# Patient Record
Sex: MALE | Race: WHITE | NOT HISPANIC OR LATINO | Employment: OTHER | URBAN - METROPOLITAN AREA
[De-identification: names, ages, dates, MRNs, and addresses within clinical notes are randomized per-mention and may not be internally consistent; named-entity substitution may affect disease eponyms.]

---

## 2019-08-02 ENCOUNTER — TELEPHONE (OUTPATIENT)
Dept: SCHEDULING | Facility: IMAGING CENTER | Age: 64
End: 2019-08-02

## 2019-12-17 ENCOUNTER — OFFICE VISIT (OUTPATIENT)
Dept: MEDICAL GROUP | Facility: MEDICAL CENTER | Age: 64
End: 2019-12-17
Payer: MEDICARE

## 2019-12-17 VITALS
DIASTOLIC BLOOD PRESSURE: 62 MMHG | HEIGHT: 75 IN | TEMPERATURE: 97.9 F | RESPIRATION RATE: 16 BRPM | WEIGHT: 208 LBS | BODY MASS INDEX: 25.86 KG/M2 | OXYGEN SATURATION: 95 % | HEART RATE: 80 BPM | SYSTOLIC BLOOD PRESSURE: 126 MMHG

## 2019-12-17 DIAGNOSIS — Z95.0 PRESENCE OF CARDIAC PACEMAKER: ICD-10-CM

## 2019-12-17 DIAGNOSIS — Z96.611 STATUS POST RIGHT SHOULDER HEMIARTHROPLASTY: ICD-10-CM

## 2019-12-17 DIAGNOSIS — I49.5 SICK SINUS SYNDROME (HCC): ICD-10-CM

## 2019-12-17 DIAGNOSIS — Z12.5 ENCOUNTER FOR SCREENING FOR MALIGNANT NEOPLASM OF PROSTATE: ICD-10-CM

## 2019-12-17 DIAGNOSIS — M10.00 IDIOPATHIC GOUT, UNSPECIFIED CHRONICITY, UNSPECIFIED SITE: ICD-10-CM

## 2019-12-17 DIAGNOSIS — Z23 NEED FOR VACCINATION: ICD-10-CM

## 2019-12-17 DIAGNOSIS — Z11.59 ENCOUNTER FOR HEPATITIS C SCREENING TEST FOR LOW RISK PATIENT: ICD-10-CM

## 2019-12-17 DIAGNOSIS — R73.01 IMPAIRED FASTING GLUCOSE: ICD-10-CM

## 2019-12-17 DIAGNOSIS — E78.49 OTHER HYPERLIPIDEMIA: ICD-10-CM

## 2019-12-17 DIAGNOSIS — G89.29 CHRONIC RIGHT SHOULDER PAIN: ICD-10-CM

## 2019-12-17 DIAGNOSIS — M25.511 CHRONIC RIGHT SHOULDER PAIN: ICD-10-CM

## 2019-12-17 DIAGNOSIS — I10 ESSENTIAL HYPERTENSION: ICD-10-CM

## 2019-12-17 DIAGNOSIS — Z98.1 HISTORY OF LUMBAR FUSION: ICD-10-CM

## 2019-12-17 PROBLEM — R00.1 BRADYCARDIA: Status: ACTIVE | Noted: 2018-07-05

## 2019-12-17 PROBLEM — S32.009A FRACTURE OF LUMBAR VERTEBRA (HCC): Status: ACTIVE | Noted: 2018-02-26

## 2019-12-17 PROBLEM — Z87.898 HISTORY OF SEIZURE: Status: ACTIVE | Noted: 2018-02-16

## 2019-12-17 PROBLEM — H26.9 CATARACT OF BOTH EYES: Status: ACTIVE | Noted: 2019-04-12

## 2019-12-17 PROBLEM — R00.1 BRADYCARDIA: Status: RESOLVED | Noted: 2018-07-05 | Resolved: 2019-12-17

## 2019-12-17 PROBLEM — S42.301A RIGHT HUMERAL FRACTURE: Status: ACTIVE | Noted: 2018-01-31

## 2019-12-17 PROBLEM — I47.29 VENTRICULAR TACHYCARDIA, NONSUSTAINED (HCC): Status: ACTIVE | Noted: 2018-06-11

## 2019-12-17 PROCEDURE — 99204 OFFICE O/P NEW MOD 45 MIN: CPT | Mod: 25 | Performed by: INTERNAL MEDICINE

## 2019-12-17 PROCEDURE — G0009 ADMIN PNEUMOCOCCAL VACCINE: HCPCS | Performed by: INTERNAL MEDICINE

## 2019-12-17 PROCEDURE — 90670 PCV13 VACCINE IM: CPT | Performed by: INTERNAL MEDICINE

## 2019-12-17 RX ORDER — LISINOPRIL 10 MG/1
TABLET ORAL
COMMUNITY
Start: 2018-02-21 | End: 2019-12-17 | Stop reason: SDUPTHER

## 2019-12-17 RX ORDER — LISINOPRIL 10 MG/1
10 TABLET ORAL DAILY
Qty: 90 TAB | Refills: 3 | Status: SHIPPED | OUTPATIENT
Start: 2019-12-17 | End: 2020-11-23

## 2019-12-17 RX ORDER — ALLOPURINOL 300 MG/1
TABLET ORAL
COMMUNITY
Start: 2019-01-31 | End: 2019-12-17 | Stop reason: SDUPTHER

## 2019-12-17 RX ORDER — BISOPROLOL FUMARATE 5 MG/1
TABLET, FILM COATED ORAL
Qty: 90 TAB | Refills: 3 | Status: SHIPPED | OUTPATIENT
Start: 2019-12-17 | End: 2020-11-23

## 2019-12-17 RX ORDER — ALLOPURINOL 300 MG/1
300 TABLET ORAL DAILY
Qty: 90 TAB | Refills: 3 | Status: SHIPPED | OUTPATIENT
Start: 2019-12-17 | End: 2020-11-23

## 2019-12-17 RX ORDER — ATORVASTATIN CALCIUM 10 MG/1
TABLET, FILM COATED ORAL
COMMUNITY
Start: 2018-02-21 | End: 2019-12-17 | Stop reason: SDUPTHER

## 2019-12-17 RX ORDER — ATORVASTATIN CALCIUM 10 MG/1
10 TABLET, FILM COATED ORAL DAILY
Qty: 90 TAB | Refills: 3 | Status: SHIPPED | OUTPATIENT
Start: 2019-12-17 | End: 2020-11-23

## 2019-12-17 RX ORDER — BISOPROLOL FUMARATE 5 MG/1
TABLET, FILM COATED ORAL
COMMUNITY
Start: 2019-04-25 | End: 2019-12-17 | Stop reason: SDUPTHER

## 2019-12-17 ASSESSMENT — PATIENT HEALTH QUESTIONNAIRE - PHQ9: CLINICAL INTERPRETATION OF PHQ2 SCORE: 0
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
## 2019-12-20 ENCOUNTER — HOSPITAL ENCOUNTER (OUTPATIENT)
Dept: LAB | Facility: MEDICAL CENTER | Age: 64
End: 2019-12-20
Attending: INTERNAL MEDICINE
Payer: MEDICARE

## 2019-12-20 DIAGNOSIS — E78.49 OTHER HYPERLIPIDEMIA: ICD-10-CM

## 2019-12-20 DIAGNOSIS — M10.00 IDIOPATHIC GOUT, UNSPECIFIED CHRONICITY, UNSPECIFIED SITE: ICD-10-CM

## 2019-12-20 DIAGNOSIS — I10 ESSENTIAL HYPERTENSION: ICD-10-CM

## 2019-12-20 DIAGNOSIS — R73.01 IMPAIRED FASTING GLUCOSE: ICD-10-CM

## 2019-12-20 DIAGNOSIS — Z11.59 ENCOUNTER FOR HEPATITIS C SCREENING TEST FOR LOW RISK PATIENT: ICD-10-CM

## 2019-12-20 DIAGNOSIS — Z12.5 ENCOUNTER FOR SCREENING FOR MALIGNANT NEOPLASM OF PROSTATE: ICD-10-CM

## 2019-12-20 LAB
ALBUMIN SERPL BCP-MCNC: 4.5 G/DL (ref 3.2–4.9)
ALBUMIN/GLOB SERPL: 1.6 G/DL
ALP SERPL-CCNC: 74 U/L (ref 30–99)
ALT SERPL-CCNC: 25 U/L (ref 2–50)
ANION GAP SERPL CALC-SCNC: 9 MMOL/L (ref 0–11.9)
AST SERPL-CCNC: 25 U/L (ref 12–45)
BASOPHILS # BLD AUTO: 0.6 % (ref 0–1.8)
BASOPHILS # BLD: 0.04 K/UL (ref 0–0.12)
BILIRUB SERPL-MCNC: 0.5 MG/DL (ref 0.1–1.5)
BUN SERPL-MCNC: 16 MG/DL (ref 8–22)
CALCIUM SERPL-MCNC: 9.4 MG/DL (ref 8.5–10.5)
CHLORIDE SERPL-SCNC: 104 MMOL/L (ref 96–112)
CHOLEST SERPL-MCNC: 170 MG/DL (ref 100–199)
CO2 SERPL-SCNC: 30 MMOL/L (ref 20–33)
CREAT SERPL-MCNC: 0.87 MG/DL (ref 0.5–1.4)
EOSINOPHIL # BLD AUTO: 0.15 K/UL (ref 0–0.51)
EOSINOPHIL NFR BLD: 2.1 % (ref 0–6.9)
ERYTHROCYTE [DISTWIDTH] IN BLOOD BY AUTOMATED COUNT: 44.1 FL (ref 35.9–50)
FASTING STATUS PATIENT QL REPORTED: NORMAL
GLOBULIN SER CALC-MCNC: 2.8 G/DL (ref 1.9–3.5)
GLUCOSE SERPL-MCNC: 93 MG/DL (ref 65–99)
HCT VFR BLD AUTO: 48.9 % (ref 42–52)
HCV AB SER QL: NEGATIVE
HDLC SERPL-MCNC: 50 MG/DL
HGB BLD-MCNC: 15.7 G/DL (ref 14–18)
IMM GRANULOCYTES # BLD AUTO: 0.02 K/UL (ref 0–0.11)
IMM GRANULOCYTES NFR BLD AUTO: 0.3 % (ref 0–0.9)
LDLC SERPL CALC-MCNC: 95 MG/DL
LYMPHOCYTES # BLD AUTO: 1.61 K/UL (ref 1–4.8)
LYMPHOCYTES NFR BLD: 22.3 % (ref 22–41)
MCH RBC QN AUTO: 30.4 PG (ref 27–33)
MCHC RBC AUTO-ENTMCNC: 32.1 G/DL (ref 33.7–35.3)
MCV RBC AUTO: 94.6 FL (ref 81.4–97.8)
MONOCYTES # BLD AUTO: 0.96 K/UL (ref 0–0.85)
MONOCYTES NFR BLD AUTO: 13.3 % (ref 0–13.4)
NEUTROPHILS # BLD AUTO: 4.43 K/UL (ref 1.82–7.42)
NEUTROPHILS NFR BLD: 61.4 % (ref 44–72)
NRBC # BLD AUTO: 0 K/UL
NRBC BLD-RTO: 0 /100 WBC
PLATELET # BLD AUTO: 223 K/UL (ref 164–446)
PMV BLD AUTO: 9.8 FL (ref 9–12.9)
POTASSIUM SERPL-SCNC: 4.5 MMOL/L (ref 3.6–5.5)
PROT SERPL-MCNC: 7.3 G/DL (ref 6–8.2)
PSA SERPL-MCNC: 1.05 NG/ML (ref 0–4)
RBC # BLD AUTO: 5.17 M/UL (ref 4.7–6.1)
SODIUM SERPL-SCNC: 143 MMOL/L (ref 135–145)
TRIGL SERPL-MCNC: 124 MG/DL (ref 0–149)
URATE SERPL-MCNC: 5.4 MG/DL (ref 2.5–8.3)
WBC # BLD AUTO: 7.2 K/UL (ref 4.8–10.8)

## 2019-12-20 PROCEDURE — 86803 HEPATITIS C AB TEST: CPT

## 2019-12-20 PROCEDURE — 84153 ASSAY OF PSA TOTAL: CPT | Mod: GA

## 2019-12-20 PROCEDURE — 84550 ASSAY OF BLOOD/URIC ACID: CPT

## 2019-12-20 PROCEDURE — 80053 COMPREHEN METABOLIC PANEL: CPT

## 2019-12-20 PROCEDURE — 83036 HEMOGLOBIN GLYCOSYLATED A1C: CPT | Mod: GA

## 2019-12-20 PROCEDURE — 80061 LIPID PANEL: CPT

## 2019-12-20 PROCEDURE — 36415 COLL VENOUS BLD VENIPUNCTURE: CPT

## 2019-12-20 PROCEDURE — 85025 COMPLETE CBC W/AUTO DIFF WBC: CPT

## 2019-12-21 LAB
EST. AVERAGE GLUCOSE BLD GHB EST-MCNC: 114 MG/DL
HBA1C MFR BLD: 5.6 % (ref 0–5.6)

## 2020-02-04 ENCOUNTER — TELEPHONE (OUTPATIENT)
Dept: CARDIOLOGY | Facility: MEDICAL CENTER | Age: 65
End: 2020-02-04

## 2020-02-19 ENCOUNTER — OFFICE VISIT (OUTPATIENT)
Dept: CARDIOLOGY | Facility: MEDICAL CENTER | Age: 65
End: 2020-02-19
Payer: MEDICARE

## 2020-02-19 VITALS
SYSTOLIC BLOOD PRESSURE: 130 MMHG | HEIGHT: 75 IN | OXYGEN SATURATION: 96 % | WEIGHT: 205 LBS | DIASTOLIC BLOOD PRESSURE: 94 MMHG | BODY MASS INDEX: 25.49 KG/M2 | RESPIRATION RATE: 16 BRPM | HEART RATE: 70 BPM

## 2020-02-19 DIAGNOSIS — I47.29 VENTRICULAR TACHYCARDIA, NONSUSTAINED (HCC): ICD-10-CM

## 2020-02-19 DIAGNOSIS — E78.49 OTHER HYPERLIPIDEMIA: ICD-10-CM

## 2020-02-19 DIAGNOSIS — Z95.0 PRESENCE OF CARDIAC PACEMAKER: ICD-10-CM

## 2020-02-19 DIAGNOSIS — I49.5 SICK SINUS SYNDROME (HCC): ICD-10-CM

## 2020-02-19 DIAGNOSIS — I10 ESSENTIAL HYPERTENSION: ICD-10-CM

## 2020-02-19 LAB — EKG IMPRESSION: NORMAL

## 2020-02-19 PROCEDURE — 93000 ELECTROCARDIOGRAM COMPLETE: CPT | Performed by: INTERNAL MEDICINE

## 2020-02-19 PROCEDURE — 99214 OFFICE O/P EST MOD 30 MIN: CPT | Performed by: INTERNAL MEDICINE

## 2020-02-19 RX ORDER — SENNOSIDES A AND B 8.6 MG/1
8.6-17.2 TABLET, FILM COATED ORAL
COMMUNITY
Start: 2018-02-14 | End: 2020-09-28

## 2020-02-19 ASSESSMENT — ENCOUNTER SYMPTOMS
STRIDOR: 0
MUSCULOSKELETAL NEGATIVE: 1
CLAUDICATION: 0
HEMOPTYSIS: 0
SPUTUM PRODUCTION: 0
FEVER: 0
GASTROINTESTINAL NEGATIVE: 1
PALPITATIONS: 0
RESPIRATORY NEGATIVE: 1
EYES NEGATIVE: 1
BRUISES/BLEEDS EASILY: 0
WHEEZING: 0
CONSTITUTIONAL NEGATIVE: 1
CHILLS: 0
LOSS OF CONSCIOUSNESS: 0
DIZZINESS: 0
COUGH: 0
ORTHOPNEA: 0
SHORTNESS OF BREATH: 0
CARDIOVASCULAR NEGATIVE: 1
NEUROLOGICAL NEGATIVE: 1
SORE THROAT: 0
WEAKNESS: 0
PND: 0

## 2020-02-19 NOTE — PROGRESS NOTES
Chief Complaint   Patient presents with   • Hypertension       Subjective:   Huan Fish is a 64 y.o. male who presents today as a new patient.  He is a 64-year-old male who has a past medical history of a seizure-like activity while he was in [place].  He was worked up by neurology but no etiology can be found.  He did have some pauses on a monitor per the outside report.  He also had some inducible VT or possibly nonsustained VT on his monitor.  He was put on his bisoprolol.  He had a biventricular pacemaker installed.  Is a Biotronik pacemaker.  He is also on lisinopril aspirin and atorvastatin.  Since that single episode in [place] he has had no further recurrence.  He did have a nuclear stress test echo and CT scan which were all normal.  He has no chest pain palpitations or PND.    Past Medical History:   Diagnosis Date   • Cataract    • Hyperlipidemia    • Hypertension    • Seizure (HCC)     questionable sz hx 2018     Past Surgical History:   Procedure Laterality Date   • OTHER  2018    Status post T12-L2 posterior spinal fusion bridging   (L1 fracture)   • OTHER  2018    pacemaker   • OTHER Right 2018    hemiarthroplasty/SHOULDER RECONSTRUCTION PARTIAL shoulder   • TONSILLECTOMY       Family History   Problem Relation Age of Onset   • Stroke Mother    • Heart Disease Father    • Hypertension Father    • Heart Disease Brother      Social History     Socioeconomic History   • Marital status:      Spouse name: Not on file   • Number of children: Not on file   • Years of education: Not on file   • Highest education level: Not on file   Occupational History   • Not on file   Social Needs   • Financial resource strain: Not on file   • Food insecurity     Worry: Not on file     Inability: Not on file   • Transportation needs     Medical: Not on file     Non-medical: Not on file   Tobacco Use   • Smoking status: Never Smoker   • Smokeless tobacco: Never Used   Substance and Sexual Activity   •  Alcohol use: Yes   • Drug use: Never   • Sexual activity: Not on file   Lifestyle   • Physical activity     Days per week: Not on file     Minutes per session: Not on file   • Stress: Not on file   Relationships   • Social connections     Talks on phone: Not on file     Gets together: Not on file     Attends Nondenominational service: Not on file     Active member of club or organization: Not on file     Attends meetings of clubs or organizations: Not on file     Relationship status: Not on file   • Intimate partner violence     Fear of current or ex partner: Not on file     Emotionally abused: Not on file     Physically abused: Not on file     Forced sexual activity: Not on file   Other Topics Concern   • Not on file   Social History Narrative   • Not on file     No Known Allergies  Outpatient Encounter Medications as of 2/19/2020   Medication Sig Dispense Refill   • aspirin EC (ECOTRIN) 81 MG Tablet Delayed Response Take  by mouth.     • lisinopril (PRINIVIL) 10 MG Tab Take 1 Tab by mouth every day. 90 Tab 3   • bisoprolol (ZEBETA) 5 MG Tab Take 1 tablet by mouth daily 90 Tab 3   • atorvastatin (LIPITOR) 10 MG Tab Take 1 Tab by mouth every day. 90 Tab 3   • allopurinol (ZYLOPRIM) 300 MG Tab Take 1 Tab by mouth every day. 90 Tab 3   • sennosides (SENOKOT) 8.6 MG Tab Take 8.6-17.2 mg by mouth.       No facility-administered encounter medications on file as of 2/19/2020.      Review of Systems   Constitutional: Negative.  Negative for chills, fever and malaise/fatigue.   HENT: Negative.  Negative for sore throat.    Eyes: Negative.    Respiratory: Negative.  Negative for cough, hemoptysis, sputum production, shortness of breath, wheezing and stridor.    Cardiovascular: Negative.  Negative for chest pain, palpitations, orthopnea, claudication, leg swelling and PND.   Gastrointestinal: Negative.    Genitourinary: Negative.    Musculoskeletal: Negative.    Skin: Negative.    Neurological: Negative.  Negative for dizziness, loss  "of consciousness and weakness.   Endo/Heme/Allergies: Negative.  Does not bruise/bleed easily.   All other systems reviewed and are negative.       Objective:   /94 (BP Location: Left arm, Patient Position: Sitting, BP Cuff Size: Adult)   Pulse 70   Resp 16   Ht 1.905 m (6' 3\")   Wt 93 kg (205 lb)   SpO2 96%   BMI 25.62 kg/m²     Physical Exam   Constitutional: He appears well-developed and well-nourished. No distress.   HENT:   Head: Normocephalic and atraumatic.   Right Ear: External ear normal.   Left Ear: External ear normal.   Nose: Nose normal.   Mouth/Throat: No oropharyngeal exudate.   Eyes: Pupils are equal, round, and reactive to light. Conjunctivae and EOM are normal. Right eye exhibits no discharge. Left eye exhibits no discharge. No scleral icterus.   Neck: Neck supple. No JVD present.   Cardiovascular: Normal rate, regular rhythm and intact distal pulses. Exam reveals no gallop and no friction rub.   No murmur heard.  Pulmonary/Chest: Effort normal. No stridor. No respiratory distress. He has no wheezes. He has no rales. He exhibits no tenderness.   Abdominal: Soft. He exhibits no distension. There is no guarding.   Musculoskeletal: Normal range of motion.         General: No tenderness, deformity or edema.   Neurological: He is alert. He has normal reflexes. He displays normal reflexes. No cranial nerve deficit. He exhibits normal muscle tone. Coordination normal.   Skin: Skin is warm and dry. No rash noted. He is not diaphoretic. No erythema. No pallor.   Psychiatric: He has a normal mood and affect. His behavior is normal. Judgment and thought content normal.   Nursing note and vitals reviewed.      Assessment:     1. Essential hypertension  EKG   2. Other hyperlipidemia     3. Presence of cardiac pacemaker     4. Sick sinus syndrome (HCC)     5. Ventricular tachycardia, nonsustained (HCC)         Medical Decision Making:  Today's Assessment / Status / Plan:     64-year-old male with a " symptom which sounds similar to seizures versus pseudoseizures with an unclear etiology but possibly cardiac.  At this point with no recurrence I am hesitant to do any further work-up.  If he has recurrence and findings on his pacemaker to support this we will then likely pursue a cardiac MRI.  In the meantime we will have him establish with our pacemaker clinic.  We will keep him on the bisoprolol.  He does report being 54% V paced we will monitor for heart failure.    1. Seizure like activity, possible VT    - cont bisoprolol    - cont PPM, interrogate as needed    2. PPM    - establish with device clinic    3. HTN    - cont meds    4. High V-pacing    - monitor for CHF

## 2020-03-02 ENCOUNTER — NON-PROVIDER VISIT (OUTPATIENT)
Dept: CARDIOLOGY | Facility: MEDICAL CENTER | Age: 65
End: 2020-03-02
Payer: MEDICARE

## 2020-03-02 VITALS
HEIGHT: 75 IN | BODY MASS INDEX: 25.49 KG/M2 | HEART RATE: 86 BPM | SYSTOLIC BLOOD PRESSURE: 132 MMHG | WEIGHT: 205 LBS | OXYGEN SATURATION: 94 % | DIASTOLIC BLOOD PRESSURE: 90 MMHG

## 2020-03-02 DIAGNOSIS — Z95.0 PRESENCE OF CARDIAC PACEMAKER: ICD-10-CM

## 2020-03-02 DIAGNOSIS — I49.5 SICK SINUS SYNDROME (HCC): ICD-10-CM

## 2020-03-02 PROCEDURE — 93280 PM DEVICE PROGR EVAL DUAL: CPT | Performed by: NURSE PRACTITIONER

## 2020-03-02 ASSESSMENT — FIBROSIS 4 INDEX: FIB4 SCORE: 1.46

## 2020-03-02 NOTE — PROGRESS NOTES
Patient saw Dr. Jenkins on 2/19/2020 to establish care. PM was implanted in July 2018 in [place], [place], for sick sinus syndrome.    Device is working normally. No mode switching episodes.  Normal sensing and capture of RA and RV leads; stable impedances. Battery longevity is 8 years 3 months.  No changes are made today.    I will try to make sure the his Biotronik home monitoring system is transferred to our clinic.    FU in 6 months for next PM check with me.    Collaborating MD: Jefry

## 2020-03-18 ENCOUNTER — TELEPHONE (OUTPATIENT)
Dept: HEALTH INFORMATION MANAGEMENT | Facility: OTHER | Age: 65
End: 2020-03-18

## 2020-03-18 NOTE — TELEPHONE ENCOUNTER
"1. Caller Name: Huan Fish speaking with wife Amira                        Call Back Number: [phone]  [facility] PCP or Specialty Provider: Yes Lashawn Putnam M.D.        2.  Does patient have any active symptoms of respiratory illness (fever OR cough OR shortness of breath)? Yes, the patient reports the following respiratory symptoms: cough. 99.5 temp, runny nose, congestion, wet loose sounding when coughing and some wheezing, sputum is light yellow, trying to cough, deep breathe and taking OTC meds.  Started Saturday and has progressively gotten worse but is still manageable.    3.  Does patient have any comoribidities? None  has pacemaker, had a \"seizure\"  2 years ago and broke his back and shoulder had spinal surgery and shoulder replacement.  Wife feels he is somewhat weaker after this.Still goes to [facility].    4.  In the last 30 days, has the patient traveled outside of the country OR in a high risk area within the  OR have any known contact with someone who has or is suspected to have COVID-19?  Yes, lives in [place] and traveled to [facility] and has been to PT at [facility] on Fri 13th    5. Disposition: Advised to schedule with their insurance's preferred virtual visit provider to limit potential exposure to others;   [facility]:   [place] [phone]  or  Doctor chad Harrison [phone] because patient lives in [place] [facility] [place] and is far from a larger medical center they should monitor for worsening symptoms and go to a . If situation is emergent proceed to local hospital ED or call 911.      Called wife back after consulting with triage physician, concern for what oxygen might be and it might be a good idea to be checked at a local .  Wife has an oximeter and will try to find that to get a baseline. Before heading to .  Note routed to PCP: Provider action needed:  Patient advised to go to urgent care for oxygen check r/t sob      "

## 2020-05-14 ENCOUNTER — APPOINTMENT (OUTPATIENT)
Dept: MEDICAL GROUP | Facility: MEDICAL CENTER | Age: 65
End: 2020-05-14
Payer: MEDICARE

## 2020-09-28 ENCOUNTER — NON-PROVIDER VISIT (OUTPATIENT)
Dept: CARDIOLOGY | Facility: MEDICAL CENTER | Age: 65
End: 2020-09-28
Payer: MEDICARE

## 2020-09-28 VITALS
RESPIRATION RATE: 18 BRPM | HEART RATE: 85 BPM | BODY MASS INDEX: 25.86 KG/M2 | OXYGEN SATURATION: 95 % | DIASTOLIC BLOOD PRESSURE: 72 MMHG | WEIGHT: 208 LBS | HEIGHT: 75 IN | SYSTOLIC BLOOD PRESSURE: 108 MMHG

## 2020-09-28 DIAGNOSIS — I49.5 SICK SINUS SYNDROME (HCC): ICD-10-CM

## 2020-09-28 DIAGNOSIS — Z95.0 PRESENCE OF CARDIAC PACEMAKER: ICD-10-CM

## 2020-09-28 PROCEDURE — 93280 PM DEVICE PROGR EVAL DUAL: CPT | Performed by: NURSE PRACTITIONER

## 2020-09-28 RX ORDER — KETOROLAC TROMETHAMINE 5 MG/ML
SOLUTION OPHTHALMIC
COMMUNITY
Start: 2020-09-15 | End: 2020-09-28

## 2020-09-28 RX ORDER — PREDNISOLONE ACETATE 10 MG/ML
SUSPENSION/ DROPS OPHTHALMIC
COMMUNITY
Start: 2020-09-15 | End: 2020-09-28

## 2020-09-28 RX ORDER — MOXIFLOXACIN 5 MG/ML
SOLUTION/ DROPS OPHTHALMIC
COMMUNITY
Start: 2020-09-15 | End: 2020-09-28

## 2020-09-28 ASSESSMENT — FIBROSIS 4 INDEX: FIB4 SCORE: 1.48

## 2020-09-28 NOTE — PROGRESS NOTES
Device is working normally. No mode switching episodes.  Normal sensing and capture of RA and RV leads; stable impedances. Battery longevity is 7 years 11 month.  No changes are made today.    FU in 6 months for next PM check with me.    He is due to see Dr. Jenkins for annual follow-up in February 2021.    I did also confirm that his Biotronik home monitor is indeed transmitting to our clinic.

## 2020-12-07 ENCOUNTER — NON-PROVIDER VISIT (OUTPATIENT)
Dept: MEDICAL GROUP | Facility: MEDICAL CENTER | Age: 65
End: 2020-12-07
Payer: MEDICARE

## 2020-12-07 DIAGNOSIS — Z23 NEED FOR VACCINATION: ICD-10-CM

## 2020-12-07 PROCEDURE — 90662 IIV NO PRSV INCREASED AG IM: CPT | Performed by: INTERNAL MEDICINE

## 2020-12-07 PROCEDURE — G0008 ADMIN INFLUENZA VIRUS VAC: HCPCS | Performed by: INTERNAL MEDICINE

## 2020-12-07 NOTE — PROGRESS NOTES
"Huan Fish is a 65 y.o. male here for a non-provider visit for:   FLU    Reason for immunization: Annual Flu Vaccine  Immunization records indicate need for vaccine: Yes, confirmed with Epic  Minimum interval has been met for this vaccine: Yes  ABN completed: Not Indicated    Order and dose verified by: SHARIFA ROSAS Dated  [id number] was given to patient: Yes  All IAC Questionnaire questions were answered \"No.\"    Patient tolerated injection and no adverse effects were observed or reported: Yes    Pt scheduled for next dose in series: Not Indicated  "

## 2021-03-29 ENCOUNTER — NON-PROVIDER VISIT (OUTPATIENT)
Dept: CARDIOLOGY | Facility: MEDICAL CENTER | Age: 66
End: 2021-03-29
Payer: MEDICARE

## 2021-03-29 VITALS
WEIGHT: 218 LBS | OXYGEN SATURATION: 94 % | HEART RATE: 72 BPM | SYSTOLIC BLOOD PRESSURE: 126 MMHG | DIASTOLIC BLOOD PRESSURE: 72 MMHG | BODY MASS INDEX: 27.1 KG/M2 | HEIGHT: 75 IN

## 2021-03-29 DIAGNOSIS — Z95.0 PRESENCE OF CARDIAC PACEMAKER: ICD-10-CM

## 2021-03-29 DIAGNOSIS — I49.5 SICK SINUS SYNDROME (HCC): ICD-10-CM

## 2021-03-29 PROCEDURE — 93280 PM DEVICE PROGR EVAL DUAL: CPT | Performed by: NURSE PRACTITIONER

## 2021-03-29 RX ORDER — ASPIRIN 81 MG/1
81 TABLET ORAL DAILY
COMMUNITY

## 2021-03-29 ASSESSMENT — FIBROSIS 4 INDEX: FIB4 SCORE: 1.48

## 2021-03-29 NOTE — PROGRESS NOTES
Device is working normally. No mode switching episodes.  Normal sensing and capture of RA and RV leads; stable impedances. Battery longevity is 7 years 7 months.  No changes are made today.    FU in 6 months for next PM check with me, as well as FU with Dr. Jenkins.

## 2021-09-20 ENCOUNTER — NON-PROVIDER VISIT (OUTPATIENT)
Dept: CARDIOLOGY | Facility: MEDICAL CENTER | Age: 66
End: 2021-09-20
Payer: MEDICARE

## 2021-09-20 VITALS
SYSTOLIC BLOOD PRESSURE: 128 MMHG | HEART RATE: 67 BPM | WEIGHT: 214 LBS | RESPIRATION RATE: 16 BRPM | BODY MASS INDEX: 26.61 KG/M2 | HEIGHT: 75 IN | OXYGEN SATURATION: 96 % | DIASTOLIC BLOOD PRESSURE: 62 MMHG

## 2021-09-20 DIAGNOSIS — I49.5 SICK SINUS SYNDROME (HCC): ICD-10-CM

## 2021-09-20 DIAGNOSIS — Z95.0 PRESENCE OF CARDIAC PACEMAKER: ICD-10-CM

## 2021-09-20 PROCEDURE — 93280 PM DEVICE PROGR EVAL DUAL: CPT | Performed by: NURSE PRACTITIONER

## 2021-09-20 ASSESSMENT — FIBROSIS 4 INDEX: FIB4 SCORE: 1.5

## 2021-09-20 NOTE — PROGRESS NOTES
Device is working normally. No mode switching episodes.  Normal sensing and capture of RA and RV leads; stable impedances. Battery longevity is 7 years 2 months.  No changes are made today.    He does see Dr. Jenkins in November 2021.    FU in 6 months for next PM check with me.

## 2021-11-03 ENCOUNTER — OFFICE VISIT (OUTPATIENT)
Dept: CARDIOLOGY | Facility: MEDICAL CENTER | Age: 66
End: 2021-11-03
Payer: MEDICARE

## 2021-11-03 VITALS
BODY MASS INDEX: 26.61 KG/M2 | HEART RATE: 86 BPM | HEIGHT: 75 IN | SYSTOLIC BLOOD PRESSURE: 124 MMHG | DIASTOLIC BLOOD PRESSURE: 84 MMHG | WEIGHT: 214 LBS | OXYGEN SATURATION: 95 % | RESPIRATION RATE: 16 BRPM

## 2021-11-03 DIAGNOSIS — I49.5 SICK SINUS SYNDROME (HCC): ICD-10-CM

## 2021-11-03 DIAGNOSIS — E78.49 OTHER HYPERLIPIDEMIA: ICD-10-CM

## 2021-11-03 DIAGNOSIS — Z95.0 PRESENCE OF CARDIAC PACEMAKER: ICD-10-CM

## 2021-11-03 DIAGNOSIS — I47.29 VENTRICULAR TACHYCARDIA, NONSUSTAINED (HCC): ICD-10-CM

## 2021-11-03 DIAGNOSIS — I71.30 ABDOMINAL AORTIC ANEURYSM, RUPTURED (HCC): ICD-10-CM

## 2021-11-03 DIAGNOSIS — I10 ESSENTIAL HYPERTENSION: ICD-10-CM

## 2021-11-03 DIAGNOSIS — Z96.611 STATUS POST RIGHT SHOULDER HEMIARTHROPLASTY: ICD-10-CM

## 2021-11-03 PROCEDURE — 99214 OFFICE O/P EST MOD 30 MIN: CPT | Performed by: INTERNAL MEDICINE

## 2021-11-03 RX ORDER — BISOPROLOL FUMARATE 5 MG/1
TABLET, FILM COATED ORAL
Qty: 90 TABLET | Refills: 3 | Status: SHIPPED | OUTPATIENT
Start: 2021-11-03 | End: 2022-09-20

## 2021-11-03 RX ORDER — ATORVASTATIN CALCIUM 10 MG/1
10 TABLET, FILM COATED ORAL EVERY EVENING
Qty: 90 TABLET | Refills: 3 | Status: SHIPPED | OUTPATIENT
Start: 2021-11-03 | End: 2022-09-20

## 2021-11-03 RX ORDER — LISINOPRIL 10 MG/1
10 TABLET ORAL DAILY
Qty: 90 TABLET | Refills: 3 | Status: SHIPPED | OUTPATIENT
Start: 2021-11-03 | End: 2022-09-20

## 2021-11-03 ASSESSMENT — ENCOUNTER SYMPTOMS
CLAUDICATION: 0
RESPIRATORY NEGATIVE: 1
SHORTNESS OF BREATH: 0
FEVER: 0
WHEEZING: 0
WEAKNESS: 0
NEUROLOGICAL NEGATIVE: 1
MUSCULOSKELETAL NEGATIVE: 1
CONSTITUTIONAL NEGATIVE: 1
BRUISES/BLEEDS EASILY: 0
LOSS OF CONSCIOUSNESS: 0
CARDIOVASCULAR NEGATIVE: 1
ORTHOPNEA: 0
EYES NEGATIVE: 1
CHILLS: 0
COUGH: 0
PND: 0
HEMOPTYSIS: 0
STRIDOR: 0
SPUTUM PRODUCTION: 0
GASTROINTESTINAL NEGATIVE: 1
DIZZINESS: 0
PALPITATIONS: 0
SORE THROAT: 0

## 2021-11-03 ASSESSMENT — FIBROSIS 4 INDEX: FIB4 SCORE: 1.5

## 2021-11-03 NOTE — PROGRESS NOTES
Chief Complaint   Patient presents with   • Hypertension   • Hyperlipidemia       Subjective     Huan Fish is a 66 y.o. male who presents today as a follow-up for his pacemaker placement for sick sinus syndrome as well as hypertension hyperlipidemia.  I did finally receive his records from [place].  It sounds like he had sick sinus syndrome but no VT was noted.  He is otherwise in the last year and doing well.  His post recent pacemaker interrogation was unremarkable.    Past Medical History:   Diagnosis Date   • Cataract    • Hyperlipidemia    • Hypertension    • Seizure (HCC)     questionable sz hx 2018   • Sick sinus syndrome (HCC) 07/2018    Status post pacemaker placement.     Past Surgical History:   Procedure Laterality Date   • PACEMAKER INSERTION Left 07/07/2018    Biotronik Edora 8 JEANNINE implanted at [facility].   • OTHER  2018    Status post T12-L2 posterior spinal fusion bridging   (L1 fracture)   • OTHER Right 2018    hemiarthroplasty/SHOULDER RECONSTRUCTION PARTIAL shoulder   • TONSILLECTOMY       Family History   Problem Relation Age of Onset   • Stroke Mother    • Heart Disease Father    • Hypertension Father    • Heart Disease Brother      Social History     Socioeconomic History   • Marital status:      Spouse name: Not on file   • Number of children: Not on file   • Years of education: Not on file   • Highest education level: Not on file   Occupational History   • Not on file   Tobacco Use   • Smoking status: Never Smoker   • Smokeless tobacco: Never Used   Vaping Use   • Vaping Use: Never used   Substance and Sexual Activity   • Alcohol use: Yes     Comment: 2 times a week   • Drug use: Never   • Sexual activity: Not on file   Other Topics Concern   • Not on file   Social History Narrative   • Not on file     Social Determinants of Health     Financial Resource Strain:    • Difficulty of Paying Living Expenses:    Food Insecurity:    • Worried About Running Out of Food in  the Last Year:    • Ran Out of Food in the Last Year:    Transportation Needs:    • Lack of Transportation (Medical):    • Lack of Transportation (Non-Medical):    Physical Activity:    • Days of Exercise per Week:    • Minutes of Exercise per Session:    Stress:    • Feeling of Stress :    Social Connections:    • Frequency of Communication with Friends and Family:    • Frequency of Social Gatherings with Friends and Family:    • Attends Hindu Services:    • Active Member of Clubs or Organizations:    • Attends Club or Organization Meetings:    • Marital Status:    Intimate Partner Violence:    • Fear of Current or Ex-Partner:    • Emotionally Abused:    • Physically Abused:    • Sexually Abused:      No Known Allergies  Outpatient Encounter Medications as of 11/3/2021   Medication Sig Dispense Refill   • lisinopril (PRINIVIL) 10 MG Tab Take 1 Tablet by mouth every day. 90 Tablet 3   • bisoprolol (ZEBETA) 5 MG Tab TAKE 1 TABLET DAILY 90 Tablet 3   • atorvastatin (LIPITOR) 10 MG Tab Take 1 Tablet by mouth every evening. 90 Tablet 3   • aspirin 81 MG EC tablet Take 81 mg by mouth every day.     • allopurinol (ZYLOPRIM) 300 MG Tab TAKE 1 TABLET DAILY 90 Tab 3   • [DISCONTINUED] lisinopril (PRINIVIL) 10 MG Tab TAKE 1 TABLET DAILY 90 Tab 3   • [DISCONTINUED] bisoprolol (ZEBETA) 5 MG Tab TAKE 1 TABLET DAILY 90 Tab 3   • [DISCONTINUED] atorvastatin (LIPITOR) 10 MG Tab TAKE 1 TABLET DAILY 90 Tab 3     No facility-administered encounter medications on file as of 11/3/2021.     Review of Systems   Constitutional: Negative.  Negative for chills, fever and malaise/fatigue.   HENT: Negative.  Negative for sore throat.    Eyes: Negative.    Respiratory: Negative.  Negative for cough, hemoptysis, sputum production, shortness of breath, wheezing and stridor.    Cardiovascular: Negative.  Negative for chest pain, palpitations, orthopnea, claudication, leg swelling and PND.   Gastrointestinal: Negative.    Genitourinary:  "Negative.    Musculoskeletal: Negative.    Skin: Negative.    Neurological: Negative.  Negative for dizziness, loss of consciousness and weakness.   Endo/Heme/Allergies: Negative.  Does not bruise/bleed easily.   All other systems reviewed and are negative.             Objective     /84 (BP Location: Left arm, Patient Position: Sitting, BP Cuff Size: Adult)   Pulse 86   Resp 16   Ht 1.905 m (6' 3\")   Wt 97.1 kg (214 lb)   SpO2 95%   BMI 26.75 kg/m²     Physical Exam  Vitals and nursing note reviewed.   Constitutional:       General: He is not in acute distress.     Appearance: He is well-developed. He is not diaphoretic.   HENT:      Head: Normocephalic and atraumatic.      Right Ear: External ear normal.      Left Ear: External ear normal.      Nose: Nose normal.      Mouth/Throat:      Pharynx: No oropharyngeal exudate.   Eyes:      General: No scleral icterus.        Right eye: No discharge.         Left eye: No discharge.      Conjunctiva/sclera: Conjunctivae normal.      Pupils: Pupils are equal, round, and reactive to light.   Neck:      Vascular: No JVD.   Cardiovascular:      Rate and Rhythm: Normal rate and regular rhythm.      Heart sounds: No murmur heard.   No friction rub. No gallop.    Pulmonary:      Effort: Pulmonary effort is normal. No respiratory distress.      Breath sounds: No stridor. No wheezing or rales.   Chest:      Chest wall: No tenderness.   Abdominal:      General: There is no distension.      Palpations: Abdomen is soft.      Tenderness: There is no guarding.   Musculoskeletal:         General: No tenderness or deformity. Normal range of motion.      Cervical back: Neck supple.   Skin:     General: Skin is warm and dry.      Coloration: Skin is not pale.      Findings: No erythema or rash.   Neurological:      Mental Status: He is alert.      Cranial Nerves: No cranial nerve deficit.      Motor: No abnormal muscle tone.      Coordination: Coordination normal.      Deep " Tendon Reflexes: Reflexes are normal and symmetric. Reflexes normal.   Psychiatric:         Behavior: Behavior normal.         Thought Content: Thought content normal.         Judgment: Judgment normal.                Assessment & Plan     1. Essential hypertension  lisinopril (PRINIVIL) 10 MG Tab    bisoprolol (ZEBETA) 5 MG Tab    TSH WITH REFLEX TO FT4   2. Other hyperlipidemia  atorvastatin (LIPITOR) 10 MG Tab    CBC W/ DIFF W/O PLATELETS    Comp Metabolic Panel    TSH WITH REFLEX TO FT4   3. Presence of cardiac pacemaker  CBC W/ DIFF W/O PLATELETS    Comp Metabolic Panel    Lipid Profile   4. Sick sinus syndrome (HCC)  CBC W/ DIFF W/O PLATELETS    Comp Metabolic Panel    Lipid Profile   5. Status post right shoulder hemiarthroplasty  CBC W/ DIFF W/O PLATELETS    Comp Metabolic Panel    Lipid Profile   6. Ventricular tachycardia, nonsustained (HCC)  Lipid Profile   7. Abdominal aortic aneurysm, ruptured (HCC)   Lipid Profile       Medical Decision Making: Today's Assessment/Status/Plan:        66-year-old male with a pacemaker placed for sick sinus syndrome hypertension hyperlipidemia.  At this point I will refill all his medications.  I will send a number of labs for his high risk medication as well as for his primary care.  I will see him back in 1 year.

## 2021-11-05 ENCOUNTER — HOSPITAL ENCOUNTER (OUTPATIENT)
Dept: LAB | Facility: MEDICAL CENTER | Age: 66
End: 2021-11-05
Attending: INTERNAL MEDICINE
Payer: MEDICARE

## 2021-11-05 DIAGNOSIS — Z96.611 STATUS POST RIGHT SHOULDER HEMIARTHROPLASTY: ICD-10-CM

## 2021-11-05 DIAGNOSIS — I47.29 VENTRICULAR TACHYCARDIA, NONSUSTAINED (HCC): ICD-10-CM

## 2021-11-05 DIAGNOSIS — Z95.0 PRESENCE OF CARDIAC PACEMAKER: ICD-10-CM

## 2021-11-05 DIAGNOSIS — I10 ESSENTIAL HYPERTENSION: ICD-10-CM

## 2021-11-05 DIAGNOSIS — I49.5 SICK SINUS SYNDROME (HCC): ICD-10-CM

## 2021-11-05 DIAGNOSIS — E78.49 OTHER HYPERLIPIDEMIA: ICD-10-CM

## 2021-11-05 DIAGNOSIS — I71.30 ABDOMINAL AORTIC ANEURYSM, RUPTURED (HCC): ICD-10-CM

## 2021-11-05 LAB
ALBUMIN SERPL BCP-MCNC: 4.3 G/DL (ref 3.2–4.9)
ALBUMIN/GLOB SERPL: 1.7 G/DL
ALP SERPL-CCNC: 84 U/L (ref 30–99)
ALT SERPL-CCNC: 28 U/L (ref 2–50)
ANION GAP SERPL CALC-SCNC: 11 MMOL/L (ref 7–16)
AST SERPL-CCNC: 22 U/L (ref 12–45)
BASOPHILS # BLD AUTO: 0.5 % (ref 0–1.8)
BASOPHILS # BLD: 0.04 K/UL (ref 0–0.12)
BILIRUB SERPL-MCNC: 0.7 MG/DL (ref 0.1–1.5)
BUN SERPL-MCNC: 15 MG/DL (ref 8–22)
CALCIUM SERPL-MCNC: 9.8 MG/DL (ref 8.5–10.5)
CHLORIDE SERPL-SCNC: 106 MMOL/L (ref 96–112)
CHOLEST SERPL-MCNC: 175 MG/DL (ref 100–199)
CO2 SERPL-SCNC: 26 MMOL/L (ref 20–33)
CREAT SERPL-MCNC: 0.91 MG/DL (ref 0.5–1.4)
EOSINOPHIL # BLD AUTO: 0.14 K/UL (ref 0–0.51)
EOSINOPHIL NFR BLD: 1.7 % (ref 0–6.9)
ERYTHROCYTE [DISTWIDTH] IN BLOOD BY AUTOMATED COUNT: 41.3 FL (ref 35.9–50)
FASTING STATUS PATIENT QL REPORTED: NORMAL
GLOBULIN SER CALC-MCNC: 2.6 G/DL (ref 1.9–3.5)
GLUCOSE SERPL-MCNC: 97 MG/DL (ref 65–99)
HCT VFR BLD AUTO: 45.7 % (ref 42–52)
HDLC SERPL-MCNC: 41 MG/DL
HGB BLD-MCNC: 15.6 G/DL (ref 14–18)
IMM GRANULOCYTES # BLD AUTO: 0.02 K/UL (ref 0–0.11)
IMM GRANULOCYTES NFR BLD AUTO: 0.2 % (ref 0–0.9)
LDLC SERPL CALC-MCNC: 108 MG/DL
LYMPHOCYTES # BLD AUTO: 2.38 K/UL (ref 1–4.8)
LYMPHOCYTES NFR BLD: 29.3 % (ref 22–41)
MCH RBC QN AUTO: 31.6 PG (ref 27–33)
MCHC RBC AUTO-ENTMCNC: 34.1 G/DL (ref 33.7–35.3)
MCV RBC AUTO: 92.5 FL (ref 81.4–97.8)
MONOCYTES # BLD AUTO: 0.64 K/UL (ref 0–0.85)
MONOCYTES NFR BLD AUTO: 7.9 % (ref 0–13.4)
NEUTROPHILS # BLD AUTO: 4.9 K/UL (ref 1.82–7.42)
NEUTROPHILS NFR BLD: 60.4 % (ref 44–72)
NRBC # BLD AUTO: 0 K/UL
NRBC BLD-RTO: 0 /100 WBC
POTASSIUM SERPL-SCNC: 4.3 MMOL/L (ref 3.6–5.5)
PROT SERPL-MCNC: 6.9 G/DL (ref 6–8.2)
RBC # BLD AUTO: 4.94 M/UL (ref 4.7–6.1)
SODIUM SERPL-SCNC: 143 MMOL/L (ref 135–145)
TRIGL SERPL-MCNC: 129 MG/DL (ref 0–149)
TSH SERPL DL<=0.005 MIU/L-ACNC: 1.77 UIU/ML (ref 0.38–5.33)
WBC # BLD AUTO: 8.1 K/UL (ref 4.8–10.8)

## 2021-11-05 PROCEDURE — 85014 HEMATOCRIT: CPT

## 2021-11-05 PROCEDURE — 36415 COLL VENOUS BLD VENIPUNCTURE: CPT

## 2021-11-05 PROCEDURE — 85041 AUTOMATED RBC COUNT: CPT

## 2021-11-05 PROCEDURE — 84443 ASSAY THYROID STIM HORMONE: CPT

## 2021-11-05 PROCEDURE — 85048 AUTOMATED LEUKOCYTE COUNT: CPT

## 2021-11-05 PROCEDURE — 85018 HEMOGLOBIN: CPT

## 2021-11-05 PROCEDURE — 80061 LIPID PANEL: CPT

## 2021-11-05 PROCEDURE — 80053 COMPREHEN METABOLIC PANEL: CPT

## 2022-03-29 ENCOUNTER — TELEPHONE (OUTPATIENT)
Dept: SCHEDULING | Facility: IMAGING CENTER | Age: 67
End: 2022-03-29

## 2022-04-12 ENCOUNTER — NON-PROVIDER VISIT (OUTPATIENT)
Dept: CARDIOLOGY | Facility: MEDICAL CENTER | Age: 67
End: 2022-04-12
Payer: MEDICARE

## 2022-04-12 VITALS
DIASTOLIC BLOOD PRESSURE: 70 MMHG | OXYGEN SATURATION: 97 % | WEIGHT: 212.2 LBS | RESPIRATION RATE: 14 BRPM | HEART RATE: 51 BPM | SYSTOLIC BLOOD PRESSURE: 124 MMHG | HEIGHT: 75 IN | BODY MASS INDEX: 26.38 KG/M2

## 2022-04-12 DIAGNOSIS — I49.5 SICK SINUS SYNDROME (HCC): ICD-10-CM

## 2022-04-12 DIAGNOSIS — Z95.0 PRESENCE OF CARDIAC PACEMAKER: ICD-10-CM

## 2022-04-12 PROCEDURE — 93280 PM DEVICE PROGR EVAL DUAL: CPT | Performed by: NURSE PRACTITIONER

## 2022-04-12 NOTE — PROGRESS NOTES
Device is working normally. No mode switching episodes.  Normal sensing and capture of RA and RV leads; stable impedances. Battery longevity is 6 years 9 months.  No changes are made today.    Follow-up in 6 months for next PM check with me.

## 2022-05-18 NOTE — PROGRESS NOTES
Subjective:     CC: Establish care    HISTORY OF THE PRESENT ILLNESS: Patient is a 66 y.o. male. This pleasant patient is here today to establish care and discuss the following issues:    Chronic right shoulder pain  The patient is requesting referral to orthopedic surgery.  He reports 2 previous right shoulder surgeries.  He is now reporting persistent limited range of motion.    Sick sinus syndrome (HCC)  Presence of cardiac pacemaker  Primary hypertension  The patient is followed by cardiology, Dr. Jenkins.  The patient is on lisinopril 10 mg daily and bisoprolol 5 mg daily.  He denies new headaches, chest pain, lightheadedness, lower extremity edema.    Idiopathic chronic gout without tophus, unspecified site  The patient is on allopurinol 300 mg daily.  Uric acid level from 12/20/2019 was normal at 5.4.  The patient has not had a flare in many years.        Allergies: Patient has no known allergies.    Current Outpatient Medications Ordered in Epic   Medication Sig Dispense Refill   • lisinopril (PRINIVIL) 10 MG Tab Take 1 Tablet by mouth every day. 90 Tablet 3   • bisoprolol (ZEBETA) 5 MG Tab TAKE 1 TABLET DAILY 90 Tablet 3   • atorvastatin (LIPITOR) 10 MG Tab Take 1 Tablet by mouth every evening. 90 Tablet 3   • aspirin 81 MG EC tablet Take 81 mg by mouth every day.     • allopurinol (ZYLOPRIM) 300 MG Tab TAKE 1 TABLET DAILY 90 Tab 3     No current Epic-ordered facility-administered medications on file.       Past Medical History:   Diagnosis Date   • Cataract    • Hyperlipidemia    • Hypertension    • Seizure (HCC)     questionable sz hx 2018   • Sick sinus syndrome (HCC) 07/2018    Status post pacemaker placement.       Past Surgical History:   Procedure Laterality Date   • PACEMAKER INSERTION Left 07/07/2018    Biotronisara PAEZ implanted at [facility].   • OTHER  2018    Status post T12-L2 posterior spinal fusion bridging   (L1 fracture)   • OTHER Right 2018    hemiarthroplasty/SHOULDER  "RECONSTRUCTION PARTIAL shoulder   • TONSILLECTOMY         Social History     Tobacco Use   • Smoking status: Never Smoker   • Smokeless tobacco: Never Used   Vaping Use   • Vaping Use: Never used   Substance Use Topics   • Alcohol use: Not Currently     Comment: 2 times a week   • Drug use: Never       Social History     Social History Narrative   • Not on file       Family History   Problem Relation Age of Onset   • Stroke Mother    • Heart Disease Father    • Hypertension Father    • Heart Disease Brother        Health Maintenance: Completed    ROS:   Gen: no fevers/chills  Pulm: no sob, no cough  CV: no chest pain, no palpitations  GI: no nausea/vomiting, no diarrhea  Neuro: no headaches, no numbness/tingling      Objective:     Exam: /70   Pulse (!) 55   Temp 36.5 °C (97.7 °F) (Temporal)   Resp 18   Ht 1.905 m (6' 3\")   Wt 96.2 kg (212 lb)   SpO2 95%  Body mass index is 26.5 kg/m².    General: Normal appearing. No distress.  HEENT: Normocephalic. Eyes conjunctiva clear lids without ptosis, pupils equal and reactive to light accommodation, oropharynx is without erythema, edema or exudates.   Pulmonary: Clear to ausculation.  Normal effort. No rales, ronchi, or wheezing.  Cardiovascular: Regular rate and rhythm without murmur.   Abdomen: Soft, nontender, nondistended.   Musculoskeletal: No extremity cyanosis, clubbing, or edema.  Psych: Normal mood and affect. Alert and oriented x3. Judgment and insight is normal.    Assessment & Plan:   66 y.o. male with the following -    Chronic right shoulder pain  Status post right shoulder hemiarthroplasty  Chronic medical condition.  The patient is requesting referral to orthopedic surgery.  He reports 2 previous right shoulder surgeries.  He is now reporting persistent limited range of motion.  - Referral to Orthopedics    Sick sinus syndrome (HCC)  Presence of cardiac pacemaker  Primary hypertension  Chronic medical condition.  Blood pressure goal of less than " 130/80.  The patient is followed by cardiology, Dr. Jenkins.  The patient is on lisinopril 10 mg daily and bisoprolol 5 mg daily.  He denies new headaches, chest pain, lightheadedness, lower extremity edema.  -Continue lisinopril 10 mg daily  -Continue bisoprolol 5 mg daily    Mixed hyperlipidemia  Chronic medical condition.  LDL not at goal of less than 100.  The patient is followed by cardiology.  He is on atorvastatin 10 mg nightly.  He denies statin associated myalgias.  Lipid panel from 11/5/2021 showed a total cholesterol 175, , HDL 41, triglycerides 129.  -Continue atorvastatin 10 mg nightly  - Comp Metabolic Panel; Future  - Lipid Profile; Future    Elevated fasting glucose  Chronic medical condition.  No recent labs.  Hemoglobin A1c from 12/20/2019 was normal at 5.6.  - HEMOGLOBIN A1C; Future    Idiopathic chronic gout without tophus, unspecified site  Chronic medical condition.  Well-controlled.  The patient is on allopurinol 300 mg daily.  Uric acid level from 12/20/2019 was normal at 5.4.  The patient has not had a flare in many years.  -Continue allopurinol 300 mg daily    Screening for deficiency anemia  - CBC WITH DIFFERENTIAL; Future    Prostate cancer screening  - PROSTATE SPECIFIC AG SCREENING; Future    Keloid  - Referral to Dermatology    Colon cancer screening  - Referral to GI for Colonoscopy      Return in about 6 months (around 11/19/2022) for f/u labs.    Please note that this dictation was created using voice recognition software. I have made every reasonable attempt to correct obvious errors, but I expect that there are errors of grammar and possibly content that I did not discover before finalizing the note.

## 2022-05-19 ENCOUNTER — OFFICE VISIT (OUTPATIENT)
Dept: MEDICAL GROUP | Facility: PHYSICIAN GROUP | Age: 67
End: 2022-05-19
Payer: MEDICARE

## 2022-05-19 VITALS
DIASTOLIC BLOOD PRESSURE: 70 MMHG | TEMPERATURE: 97.7 F | BODY MASS INDEX: 26.36 KG/M2 | RESPIRATION RATE: 18 BRPM | HEART RATE: 55 BPM | OXYGEN SATURATION: 95 % | HEIGHT: 75 IN | SYSTOLIC BLOOD PRESSURE: 110 MMHG | WEIGHT: 212 LBS

## 2022-05-19 DIAGNOSIS — Z12.5 PROSTATE CANCER SCREENING: ICD-10-CM

## 2022-05-19 DIAGNOSIS — R73.01 ELEVATED FASTING GLUCOSE: ICD-10-CM

## 2022-05-19 DIAGNOSIS — Z95.0 PRESENCE OF CARDIAC PACEMAKER: ICD-10-CM

## 2022-05-19 DIAGNOSIS — I49.5 SICK SINUS SYNDROME (HCC): ICD-10-CM

## 2022-05-19 DIAGNOSIS — Z96.611 STATUS POST RIGHT SHOULDER HEMIARTHROPLASTY: ICD-10-CM

## 2022-05-19 DIAGNOSIS — Z12.11 COLON CANCER SCREENING: ICD-10-CM

## 2022-05-19 DIAGNOSIS — L91.0 KELOID: ICD-10-CM

## 2022-05-19 DIAGNOSIS — I10 PRIMARY HYPERTENSION: ICD-10-CM

## 2022-05-19 DIAGNOSIS — M1A.00X0 IDIOPATHIC CHRONIC GOUT WITHOUT TOPHUS, UNSPECIFIED SITE: ICD-10-CM

## 2022-05-19 DIAGNOSIS — M25.511 CHRONIC RIGHT SHOULDER PAIN: ICD-10-CM

## 2022-05-19 DIAGNOSIS — Z13.0 SCREENING FOR DEFICIENCY ANEMIA: ICD-10-CM

## 2022-05-19 DIAGNOSIS — G89.29 CHRONIC RIGHT SHOULDER PAIN: ICD-10-CM

## 2022-05-19 DIAGNOSIS — E78.2 MIXED HYPERLIPIDEMIA: ICD-10-CM

## 2022-05-19 PROCEDURE — 99214 OFFICE O/P EST MOD 30 MIN: CPT | Performed by: INTERNAL MEDICINE

## 2022-05-19 ASSESSMENT — PATIENT HEALTH QUESTIONNAIRE - PHQ9: CLINICAL INTERPRETATION OF PHQ2 SCORE: 0

## 2022-07-09 ENCOUNTER — NON-PROVIDER VISIT (OUTPATIENT)
Dept: CARDIOLOGY | Facility: MEDICAL CENTER | Age: 67
End: 2022-07-09
Payer: MEDICARE

## 2022-07-12 NOTE — CARDIAC REMOTE MONITOR - SCAN
Device transmission reviewed. Device demonstrated appropriate function.       Electronically Signed by: Ilia Arreaga M.D.    7/23/2022  3:46 PM

## 2022-07-28 ENCOUNTER — APPOINTMENT (RX ONLY)
Dept: URBAN - METROPOLITAN AREA CLINIC 4 | Facility: CLINIC | Age: 67
Setting detail: DERMATOLOGY
End: 2022-07-28

## 2022-07-28 DIAGNOSIS — Z71.89 OTHER SPECIFIED COUNSELING: ICD-10-CM

## 2022-07-28 DIAGNOSIS — L91.0 HYPERTROPHIC SCAR: ICD-10-CM

## 2022-07-28 PROCEDURE — ? INTRALESIONAL KENALOG

## 2022-07-28 PROCEDURE — ? COUNSELING

## 2022-07-28 PROCEDURE — 11900 INJECT SKIN LESIONS </W 7: CPT

## 2022-07-28 ASSESSMENT — LOCATION DETAILED DESCRIPTION DERM
LOCATION DETAILED: LEFT RADIAL DORSAL HAND
LOCATION DETAILED: LEFT LATERAL SUPERIOR CHEST
LOCATION DETAILED: RIGHT ULNAR DORSAL HAND

## 2022-07-28 ASSESSMENT — LOCATION SIMPLE DESCRIPTION DERM
LOCATION SIMPLE: LEFT HAND
LOCATION SIMPLE: CHEST
LOCATION SIMPLE: RIGHT HAND

## 2022-07-28 ASSESSMENT — LOCATION ZONE DERM
LOCATION ZONE: TRUNK
LOCATION ZONE: HAND

## 2022-07-28 NOTE — PROCEDURE: MIPS QUALITY
Quality 130: Documentation Of Current Medications In The Medical Record: Current Medications Documented
Quality 431: Preventive Care And Screening: Unhealthy Alcohol Use - Screening: Patient not identified as an unhealthy alcohol user when screened for unhealthy alcohol use using a systematic screening method
Quality 111:Pneumonia Vaccination Status For Older Adults: Pneumococcal vaccine administered on or after patient’s 60th birthday and before the end of the measurement period
Quality 110: Preventive Care And Screening: Influenza Immunization: Influenza Immunization Administered during Influenza season
Detail Level: Detailed
Quality 226: Preventive Care And Screening: Tobacco Use: Screening And Cessation Intervention: Patient screened for tobacco use and is an ex/non-smoker

## 2022-07-28 NOTE — PROCEDURE: INTRALESIONAL KENALOG
Medical Necessity Clause: This procedure was medically necessary because the lesions that were treated were:
Concentration Of Kenalog Solution Injected (Mg/Ml): 40.0
Validate Note Data When Using Inventory: Yes
Include Z78.9 (Other Specified Conditions Influencing Health Status) As An Associated Diagnosis?: No
Kenalog Preparation: Kenalog
Total Volume (Ccs): 0.2
Expiration Date For Kenalog (Optional): FEB 2023
X Size Of Lesion In Cm (Optional): 0
Consent: The risks of atrophy were reviewed with the patient.
Detail Level: Detailed
Lot # For Kenalog (Optional): [id number]

## 2022-08-01 ENCOUNTER — APPOINTMENT (RX ONLY)
Dept: URBAN - METROPOLITAN AREA CLINIC 4 | Facility: CLINIC | Age: 67
Setting detail: DERMATOLOGY
End: 2022-08-01

## 2022-08-01 DIAGNOSIS — Z71.89 OTHER SPECIFIED COUNSELING: ICD-10-CM

## 2022-08-01 DIAGNOSIS — L91.0 HYPERTROPHIC SCAR: ICD-10-CM

## 2022-08-01 PROCEDURE — 11900 INJECT SKIN LESIONS </W 7: CPT

## 2022-08-01 PROCEDURE — ? COUNSELING

## 2022-08-01 PROCEDURE — ? INTRALESIONAL KENALOG

## 2022-08-01 ASSESSMENT — LOCATION DETAILED DESCRIPTION DERM
LOCATION DETAILED: RIGHT ULNAR DORSAL HAND
LOCATION DETAILED: LEFT RADIAL DORSAL HAND
LOCATION DETAILED: LEFT LATERAL SUPERIOR CHEST

## 2022-08-01 ASSESSMENT — LOCATION ZONE DERM
LOCATION ZONE: HAND
LOCATION ZONE: TRUNK

## 2022-08-01 ASSESSMENT — LOCATION SIMPLE DESCRIPTION DERM
LOCATION SIMPLE: RIGHT HAND
LOCATION SIMPLE: CHEST
LOCATION SIMPLE: LEFT HAND

## 2022-08-01 NOTE — PROCEDURE: INTRALESIONAL KENALOG
Medical Necessity Clause: This procedure was medically necessary because the lesions that were treated were:
Concentration Of Kenalog Solution Injected (Mg/Ml): 40.0
Validate Note Data When Using Inventory: Yes
Include Z78.9 (Other Specified Conditions Influencing Health Status) As An Associated Diagnosis?: No
Kenalog Preparation: Kenalog
Total Volume (Ccs): 0.05
Expiration Date For Kenalog (Optional): FEB 2023
X Size Of Lesion In Cm (Optional): 0
Consent: The risks of atrophy were reviewed with the patient.
Detail Level: Detailed
Lot # For Kenalog (Optional): [id number]

## 2022-10-08 ENCOUNTER — NON-PROVIDER VISIT (OUTPATIENT)
Dept: CARDIOLOGY | Facility: MEDICAL CENTER | Age: 67
End: 2022-10-08
Payer: MEDICARE

## 2022-10-08 PROCEDURE — 93294 REM INTERROG EVL PM/LDLS PM: CPT | Performed by: INTERNAL MEDICINE

## 2022-10-10 NOTE — CARDIAC REMOTE MONITOR - SCAN
Device transmission reviewed. Device demonstrated appropriate function.       Electronically Signed by: Santo Mayberry M.D.    10/11/2022  11:25 AM

## 2022-10-18 ENCOUNTER — NON-PROVIDER VISIT (OUTPATIENT)
Dept: CARDIOLOGY | Facility: MEDICAL CENTER | Age: 67
End: 2022-10-18
Payer: MEDICARE

## 2022-10-18 VITALS
BODY MASS INDEX: 25.74 KG/M2 | HEART RATE: 75 BPM | OXYGEN SATURATION: 96 % | DIASTOLIC BLOOD PRESSURE: 68 MMHG | RESPIRATION RATE: 16 BRPM | HEIGHT: 75 IN | SYSTOLIC BLOOD PRESSURE: 122 MMHG | WEIGHT: 207 LBS

## 2022-10-18 DIAGNOSIS — I49.5 SICK SINUS SYNDROME (HCC): ICD-10-CM

## 2022-10-18 DIAGNOSIS — Z95.0 PRESENCE OF CARDIAC PACEMAKER: ICD-10-CM

## 2022-10-18 DIAGNOSIS — I49.3 PVCS (PREMATURE VENTRICULAR CONTRACTIONS): ICD-10-CM

## 2022-10-18 PROCEDURE — 93280 PM DEVICE PROGR EVAL DUAL: CPT | Performed by: NURSE PRACTITIONER

## 2022-10-18 NOTE — PROGRESS NOTES
Device is working normally. No mode switching episodes; PVC burden 9%.  Normal sensing and capture of RA and RV leads; stable impedances. Battery longevity is 6 years 5 months.  No changes are made today.    Follow-up in 6 months for next PM check with me.

## 2022-11-14 NOTE — PROGRESS NOTES
"Subjective:     CC:   Chief Complaint   Patient presents with    Follow-Up       HPI:   Huan Fihs is a 67-year-old male who presents today for routine follow-up visit. The patient feels well and has no acute medical complaints.  He did not get his labs done.  He would like to receive his Tdap vaccine that is overdue.  He also wonders if he could stop his allopurinol as he has not had a gout attack in many years.    Past Medical History:   Diagnosis Date    Cataract     Hyperlipidemia     Hypertension     Seizure (HCC)     questionable sz hx 2018    Sick sinus syndrome (HCC) 07/2018    Status post pacemaker placement.       Social History     Tobacco Use    Smoking status: Never    Smokeless tobacco: Never   Vaping Use    Vaping Use: Never used   Substance Use Topics    Alcohol use: Yes     Alcohol/week: 2.4 oz     Types: 4 Standard drinks or equivalent per week     Comment: 2 times a week    Drug use: Never       Current Outpatient Medications Ordered in Epic   Medication Sig Dispense Refill    atorvastatin (LIPITOR) 10 MG Tab TAKE 1 TABLET EVERY EVENING 90 Tablet 0    bisoprolol (ZEBETA) 5 MG Tab TAKE 1 TABLET DAILY 90 Tablet 0    lisinopril (PRINIVIL) 10 MG Tab TAKE 1 TABLET DAILY 90 Tablet 0    aspirin 81 MG EC tablet Take 1 Tablet by mouth every day.      allopurinol (ZYLOPRIM) 300 MG Tab TAKE 1 TABLET DAILY 90 Tab 3     No current Epic-ordered facility-administered medications on file.       Allergies:  Patient has no known allergies.    Health Maintenance: Completed    Review of Systems:  No fevers or chills. No cough, chest pain, or shortness of breath.       Objective:       Exam:  /64 (BP Location: Right arm, Patient Position: Sitting, BP Cuff Size: Adult)   Pulse 77   Temp 36.5 °C (97.7 °F) (Temporal)   Resp 18   Ht 1.905 m (6' 3\")   Wt 95.3 kg (210 lb)   SpO2 95%   BMI 26.25 kg/m²  Body mass index is 26.25 kg/m².    Gen: Alert and oriented, No apparent distress.  Lungs: Normal effort, CTA " bilaterally, no wheezes, rhonchi, or rales  CV: Regular rate and rhythm. No murmurs, rubs, or gallops.  Ext: No clubbing, cyanosis, edema.      Assessment & Plan:     67 y.o. male with the following -     Sick sinus syndrome (HCC)  Presence of cardiac pacemaker  Primary hypertension  Chronic medical condition.  Ongoing and well-controlled.  The patient is managed by cardiology, Dr. Jenkins.  He is taking lisinopril 10 mg daily and bisoprolol 5 mg daily.  He denies new headaches, chest pain, lightheadedness, lower extremity edema.  -Continue lisinopril 10 mg daily and bisoprolol 5 mg daily as managed per cardiology     Mixed hyperlipidemia  Chronic medical condition.  Ongoing and well controlled.  The patient is managed by cardiology.  He is taking atorvastatin 10 mg nightly.  He denies statin associated myalgias.  Lipid panel from 11/5/2021 showed a total cholesterol 175, , HDL 41, triglycerides 129.  He is due for updated labs  -Continue atorvastatin 10 mg nightly pending updated labs     Elevated fasting glucose  Chronic medical condition.  Diet controlled.  Hemoglobin A1c from 12/20/2019 was normal at 5.6.  The patient is due for updated labs.     Idiopathic chronic gout without tophus, unspecified site  Chronic medical condition.  Well-controlled.  The patient is on allopurinol 300 mg daily.  Uric acid level from 12/20/2019 was normal at 5.4.  The patient has not had a flare in many years and wonders if he could stop taking his allopurinol.  -Advised patient we will check his uric acid level and that he could try stopping the medication and see if he gets a gout attack   - URIC ACID; Future    Need for vaccination  - Tdap =>6yo IM    Return in about 6 months (around 5/17/2023).    Please note that this dictation was created using voice recognition software. I have made every reasonable attempt to correct obvious errors, but I expect that there are errors of grammar and possibly content that I did not  discover before finalizing the note.

## 2022-11-17 ENCOUNTER — OFFICE VISIT (OUTPATIENT)
Dept: MEDICAL GROUP | Facility: PHYSICIAN GROUP | Age: 67
End: 2022-11-17
Payer: MEDICARE

## 2022-11-17 VITALS
BODY MASS INDEX: 26.11 KG/M2 | SYSTOLIC BLOOD PRESSURE: 122 MMHG | DIASTOLIC BLOOD PRESSURE: 64 MMHG | HEART RATE: 77 BPM | RESPIRATION RATE: 18 BRPM | TEMPERATURE: 97.7 F | HEIGHT: 75 IN | OXYGEN SATURATION: 95 % | WEIGHT: 210 LBS

## 2022-11-17 DIAGNOSIS — I10 PRIMARY HYPERTENSION: ICD-10-CM

## 2022-11-17 DIAGNOSIS — E78.2 MIXED HYPERLIPIDEMIA: ICD-10-CM

## 2022-11-17 DIAGNOSIS — I49.5 SICK SINUS SYNDROME (HCC): ICD-10-CM

## 2022-11-17 DIAGNOSIS — M1A.00X0 IDIOPATHIC CHRONIC GOUT WITHOUT TOPHUS, UNSPECIFIED SITE: ICD-10-CM

## 2022-11-17 DIAGNOSIS — R73.01 ELEVATED FASTING GLUCOSE: ICD-10-CM

## 2022-11-17 DIAGNOSIS — Z23 NEED FOR VACCINATION: ICD-10-CM

## 2022-11-17 DIAGNOSIS — Z95.0 PRESENCE OF CARDIAC PACEMAKER: ICD-10-CM

## 2022-11-17 PROCEDURE — 90471 IMMUNIZATION ADMIN: CPT | Performed by: INTERNAL MEDICINE

## 2022-11-17 PROCEDURE — 99214 OFFICE O/P EST MOD 30 MIN: CPT | Mod: 25 | Performed by: INTERNAL MEDICINE

## 2022-11-17 PROCEDURE — 90715 TDAP VACCINE 7 YRS/> IM: CPT | Performed by: INTERNAL MEDICINE

## 2023-01-07 ENCOUNTER — NON-PROVIDER VISIT (OUTPATIENT)
Dept: CARDIOLOGY | Facility: MEDICAL CENTER | Age: 68
End: 2023-01-07
Payer: MEDICARE

## 2023-01-10 NOTE — CARDIAC REMOTE MONITOR - SCAN
Device transmission reviewed. Device demonstrated appropriate function.       Electronically Signed by: Ilia Arreaga M.D.    1/13/2023  10:30 AM

## 2023-04-08 ENCOUNTER — NON-PROVIDER VISIT (OUTPATIENT)
Dept: CARDIOLOGY | Facility: MEDICAL CENTER | Age: 68
End: 2023-04-08
Payer: MEDICARE

## 2023-04-08 PROCEDURE — 93294 REM INTERROG EVL PM/LDLS PM: CPT | Performed by: INTERNAL MEDICINE

## 2023-04-10 NOTE — CARDIAC REMOTE MONITOR - SCAN
Device transmission reviewed. Device demonstrated appropriate function.       Electronically Signed by: Ilia Arreaga M.D.    4/10/2023  2:47 PM

## 2023-04-18 ENCOUNTER — OFFICE VISIT (OUTPATIENT)
Dept: CARDIOLOGY | Facility: MEDICAL CENTER | Age: 68
End: 2023-04-18
Payer: MEDICARE

## 2023-04-18 ENCOUNTER — NON-PROVIDER VISIT (OUTPATIENT)
Dept: CARDIOLOGY | Facility: MEDICAL CENTER | Age: 68
End: 2023-04-18
Payer: MEDICARE

## 2023-04-18 VITALS
HEART RATE: 73 BPM | BODY MASS INDEX: 25.86 KG/M2 | OXYGEN SATURATION: 92 % | RESPIRATION RATE: 16 BRPM | HEIGHT: 75 IN | WEIGHT: 208 LBS | DIASTOLIC BLOOD PRESSURE: 82 MMHG | SYSTOLIC BLOOD PRESSURE: 132 MMHG

## 2023-04-18 VITALS
OXYGEN SATURATION: 92 % | RESPIRATION RATE: 16 BRPM | SYSTOLIC BLOOD PRESSURE: 132 MMHG | BODY MASS INDEX: 25.86 KG/M2 | DIASTOLIC BLOOD PRESSURE: 82 MMHG | HEART RATE: 73 BPM | HEIGHT: 75 IN | WEIGHT: 208 LBS

## 2023-04-18 DIAGNOSIS — I49.5 SICK SINUS SYNDROME (HCC): ICD-10-CM

## 2023-04-18 DIAGNOSIS — I47.29 VENTRICULAR TACHYCARDIA, NONSUSTAINED (HCC): ICD-10-CM

## 2023-04-18 DIAGNOSIS — I10 ESSENTIAL HYPERTENSION: ICD-10-CM

## 2023-04-18 DIAGNOSIS — Z95.0 PRESENCE OF CARDIAC PACEMAKER: ICD-10-CM

## 2023-04-18 DIAGNOSIS — E78.2 MIXED HYPERLIPIDEMIA: ICD-10-CM

## 2023-04-18 DIAGNOSIS — I49.3 PVCS (PREMATURE VENTRICULAR CONTRACTIONS): ICD-10-CM

## 2023-04-18 DIAGNOSIS — E78.49 OTHER HYPERLIPIDEMIA: ICD-10-CM

## 2023-04-18 DIAGNOSIS — I10 PRIMARY HYPERTENSION: ICD-10-CM

## 2023-04-18 PROCEDURE — 99214 OFFICE O/P EST MOD 30 MIN: CPT | Mod: 25 | Performed by: NURSE PRACTITIONER

## 2023-04-18 PROCEDURE — 93280 PM DEVICE PROGR EVAL DUAL: CPT | Performed by: NURSE PRACTITIONER

## 2023-04-18 RX ORDER — ATORVASTATIN CALCIUM 10 MG/1
10 TABLET, FILM COATED ORAL EVERY EVENING
Qty: 100 TABLET | Refills: 3 | Status: SHIPPED | OUTPATIENT
Start: 2023-05-18

## 2023-04-18 RX ORDER — LISINOPRIL 10 MG/1
10 TABLET ORAL DAILY
Qty: 100 TABLET | Refills: 3 | Status: SHIPPED | OUTPATIENT
Start: 2023-05-18

## 2023-04-18 RX ORDER — BISOPROLOL FUMARATE 5 MG/1
5 TABLET, FILM COATED ORAL DAILY
Qty: 100 TABLET | Refills: 3 | Status: SHIPPED | OUTPATIENT
Start: 2023-05-18

## 2023-04-18 ASSESSMENT — ENCOUNTER SYMPTOMS
ABDOMINAL PAIN: 0
HEADACHES: 0
LOSS OF CONSCIOUSNESS: 0
PALPITATIONS: 0
BRUISES/BLEEDS EASILY: 0
COUGH: 0
SHORTNESS OF BREATH: 0
DIZZINESS: 0
ORTHOPNEA: 0
INSOMNIA: 0
FEVER: 0
NAUSEA: 0
CHILLS: 0
MYALGIAS: 0
PND: 0

## 2023-04-18 NOTE — PROGRESS NOTES
Chief Complaint   Patient presents with    Follow-Up    Pacemaker Check/Dysfunction    HTN (Controlled)    Hyperlipidemia    Premature Ventricular Contractions (PVCs)              Subjective     Huan Fish is a 67 y.o. male who presents today for six month follow-up for PM check, SSS, HTN and hyperlipidemia.    Roshan is a 67 year old male with history of sick sinus syndrome/sinus bradycardia with PPM since 2018, HTN, hyperlipidemia and PVCs, previously followed by Dr. Jenkins.    Since the last visit, he has been stable: no chest pain, pressure, tightness or discomfort; no palpitations; no shortness of breath, orthopnea or PND; no dizziness or syncope; no LE edema. BP has been stable. He will be getting labs done next week for his PCP.    Past Medical History:   Diagnosis Date    Cataract     Hyperlipidemia     Hypertension     Seizure (HCC)     questionable sz hx 2018    Sick sinus syndrome (HCC) 07/2018    Status post pacemaker placement.     Past Surgical History:   Procedure Laterality Date    PACEMAKER INSERTION Left 07/07/2018    Biotronik Edora 8 JEANNINE implanted at [facility].    OTHER  2018    Status post T12-L2 posterior spinal fusion bridging   (L1 fracture)    OTHER Right 2018    hemiarthroplasty/SHOULDER RECONSTRUCTION PARTIAL shoulder    TONSILLECTOMY       Family History   Problem Relation Age of Onset    Stroke Mother     Heart Disease Father     Hypertension Father     Heart Disease Brother      Social History     Socioeconomic History    Marital status:      Spouse name: Not on file    Number of children: Not on file    Years of education: Not on file    Highest education level: Not on file   Occupational History    Not on file   Tobacco Use    Smoking status: Never    Smokeless tobacco: Never   Vaping Use    Vaping Use: Never used   Substance and Sexual Activity    Alcohol use: Yes     Alcohol/week: 2.4 oz     Types: 4 Standard drinks or equivalent per week     Comment: 2  "times a week    Drug use: Never    Sexual activity: Not on file   Other Topics Concern    Not on file   Social History Narrative    Not on file     Social Determinants of Health     Financial Resource Strain: Not on file   Food Insecurity: Not on file   Transportation Needs: Not on file   Physical Activity: Not on file   Stress: Not on file   Social Connections: Not on file   Intimate Partner Violence: Not on file   Housing Stability: Not on file     No Known Allergies  Outpatient Encounter Medications as of 4/18/2023   Medication Sig Dispense Refill    aspirin 81 MG EC tablet Take 1 Tablet by mouth every day.      allopurinol (ZYLOPRIM) 300 MG Tab TAKE 1 TABLET DAILY 90 Tab 3     No facility-administered encounter medications on file as of 4/18/2023.     Review of Systems   Constitutional:  Negative for chills and fever.   HENT:  Negative for congestion.    Respiratory:  Negative for cough and shortness of breath.    Cardiovascular:  Negative for chest pain, palpitations, orthopnea, leg swelling and PND.   Gastrointestinal:  Negative for abdominal pain and nausea.   Musculoskeletal:  Negative for myalgias.   Skin:  Negative for rash.   Neurological:  Negative for dizziness, loss of consciousness and headaches.   Endo/Heme/Allergies:  Does not bruise/bleed easily.   Psychiatric/Behavioral:  The patient does not have insomnia.             Objective     /82 (BP Location: Left arm, Patient Position: Sitting)   Pulse 73   Resp 16   Ht 1.905 m (6' 3\")   Wt 94.3 kg (208 lb)   SpO2 92%   BMI 26.00 kg/m²     Physical Exam  Constitutional:       Appearance: He is well-developed.   HENT:      Head: Normocephalic.   Neck:      Vascular: No JVD.   Cardiovascular:      Rate and Rhythm: Normal rate and regular rhythm.      Heart sounds: Normal heart sounds.      Comments: PM in left chest wall.  Pulmonary:      Effort: Pulmonary effort is normal. No respiratory distress.      Breath sounds: Normal breath sounds. No " wheezing or rales.   Abdominal:      General: Bowel sounds are normal. There is no distension.      Palpations: Abdomen is soft.      Tenderness: There is no abdominal tenderness.   Musculoskeletal:         General: Normal range of motion.      Cervical back: Normal range of motion and neck supple.   Skin:     General: Skin is warm and dry.      Findings: No rash.   Neurological:      Mental Status: He is alert and oriented to person, place, and time.   Psychiatric:         Mood and Affect: Mood normal.         Behavior: Behavior normal.     PM interrogation today reveals normal function. No mode switching episodes. No changes are made today. Battery longevity is good for 6 years.    FINDINGS OF MPI OF 6/6/2018:  TECHNICAL QUALITY: Good without artifacts.  MYOCARDIAL PERFUSION STRESS: No significant perfusion defects.   SUM STRESS SCORE: 3  LV ENLARGEMENT: No   LV EDV: 87 mL  LVEF: 56%   WALL MOTION: Normal myocardial contractility with preserved left ventricular myocardial systolic thickening.      Lab Results   Component Value Date/Time    CHOLSTRLTOT 175 11/05/2021 09:50 AM     (H) 11/05/2021 09:50 AM    HDL 41 11/05/2021 09:50 AM    TRIGLYCERIDE 129 11/05/2021 09:50 AM        Lab Results   Component Value Date/Time    SODIUM 143 11/05/2021 09:50 AM    POTASSIUM 4.3 11/05/2021 09:50 AM    CHLORIDE 106 11/05/2021 09:50 AM    CO2 26 11/05/2021 09:50 AM    GLUCOSE 97 11/05/2021 09:50 AM    BUN 15 11/05/2021 09:50 AM    CREATININE 0.91 11/05/2021 09:50 AM      Lab Results   Component Value Date/Time    ASTSGOT 22 11/05/2021 09:50 AM    ALTSGPT 28 11/05/2021 09:50 AM       Lab Results   Component Value Date/Time    WBC 8.1 11/05/2021 09:50 AM    RBC 4.94 11/05/2021 09:50 AM    HEMOGLOBIN 15.6 11/05/2021 09:50 AM    HEMATOCRIT 45.7 11/05/2021 09:50 AM    MCV 92.5 11/05/2021 09:50 AM    MCH 31.6 11/05/2021 09:50 AM    MCHC 34.1 11/05/2021 09:50 AM    MPV 9.8 12/20/2019 11:25 AM                Assessment & Plan      1. Presence of cardiac pacemaker        2. Sick sinus syndrome (HCC)        3. Ventricular tachycardia, nonsustained (HCC)        4. PVCs (premature ventricular contractions)        5. Primary hypertension        6. Mixed hyperlipidemia            Medical Decision Making: Today's Assessment/Status/Plan:      1. Sick sinus syndrome with PPM, which is working normally. No changes are made today.    2. History of VT, on Bisoprolol, none seen on PM interrogation.    3. Hypertension, treated with BB and ACEi, stable. BP is good today.    4. Hyperlipidemia, treated with Lipitor. To get labs next week for PCP.    He is doing well, and remains stable. Same medications for now, which are all renewed. Follow-up in 6 months for next PM check.

## 2023-07-08 ENCOUNTER — NON-PROVIDER VISIT (OUTPATIENT)
Dept: CARDIOLOGY | Facility: MEDICAL CENTER | Age: 68
End: 2023-07-08
Payer: MEDICARE

## 2023-07-08 PROCEDURE — 93294 REM INTERROG EVL PM/LDLS PM: CPT | Performed by: INTERNAL MEDICINE

## 2023-07-10 NOTE — CARDIAC REMOTE MONITOR - SCAN
Device transmission reviewed. Device demonstrated appropriate function.       Electronically Signed by: Santo Mayberry M.D.    7/14/2023  11:34 AM

## 2023-10-07 ENCOUNTER — NON-PROVIDER VISIT (OUTPATIENT)
Dept: CARDIOLOGY | Facility: MEDICAL CENTER | Age: 68
End: 2023-10-07
Payer: MEDICARE

## 2023-10-07 PROCEDURE — 93294 REM INTERROG EVL PM/LDLS PM: CPT | Performed by: INTERNAL MEDICINE

## 2023-10-09 NOTE — CARDIAC REMOTE MONITOR - SCAN
Device transmission reviewed. Device demonstrated appropriate function.       Electronically Signed by: Keyla Lam M.D.    10/12/2023  9:14 AM

## 2023-10-23 ENCOUNTER — NON-PROVIDER VISIT (OUTPATIENT)
Dept: CARDIOLOGY | Facility: MEDICAL CENTER | Age: 68
End: 2023-10-23
Attending: NURSE PRACTITIONER
Payer: MEDICARE

## 2023-10-23 VITALS
DIASTOLIC BLOOD PRESSURE: 75 MMHG | OXYGEN SATURATION: 95 % | SYSTOLIC BLOOD PRESSURE: 125 MMHG | BODY MASS INDEX: 26.11 KG/M2 | WEIGHT: 210 LBS | HEART RATE: 82 BPM | HEIGHT: 75 IN | RESPIRATION RATE: 16 BRPM

## 2023-10-23 VITALS
HEART RATE: 82 BPM | DIASTOLIC BLOOD PRESSURE: 75 MMHG | BODY MASS INDEX: 26.11 KG/M2 | HEIGHT: 75 IN | RESPIRATION RATE: 16 BRPM | WEIGHT: 210 LBS | OXYGEN SATURATION: 95 % | SYSTOLIC BLOOD PRESSURE: 125 MMHG

## 2023-10-23 DIAGNOSIS — I10 PRIMARY HYPERTENSION: ICD-10-CM

## 2023-10-23 DIAGNOSIS — I49.5 SICK SINUS SYNDROME (HCC): ICD-10-CM

## 2023-10-23 DIAGNOSIS — Z95.0 PRESENCE OF CARDIAC PACEMAKER: ICD-10-CM

## 2023-10-23 DIAGNOSIS — E78.2 MIXED HYPERLIPIDEMIA: ICD-10-CM

## 2023-10-23 DIAGNOSIS — M1A.00X0 IDIOPATHIC CHRONIC GOUT WITHOUT TOPHUS, UNSPECIFIED SITE: ICD-10-CM

## 2023-10-23 PROCEDURE — 99214 OFFICE O/P EST MOD 30 MIN: CPT | Performed by: NURSE PRACTITIONER

## 2023-10-23 PROCEDURE — 93288 INTERROG EVL PM/LDLS PM IP: CPT | Performed by: NURSE PRACTITIONER

## 2023-10-23 PROCEDURE — 3074F SYST BP LT 130 MM HG: CPT | Performed by: NURSE PRACTITIONER

## 2023-10-23 PROCEDURE — 3078F DIAST BP <80 MM HG: CPT | Performed by: NURSE PRACTITIONER

## 2023-10-23 PROCEDURE — 93288 INTERROG EVL PM/LDLS PM IP: CPT | Mod: 26 | Performed by: NURSE PRACTITIONER

## 2023-10-23 PROCEDURE — 99211 OFF/OP EST MAY X REQ PHY/QHP: CPT | Performed by: NURSE PRACTITIONER

## 2023-10-23 ASSESSMENT — ENCOUNTER SYMPTOMS
DIZZINESS: 0
NAUSEA: 0
PND: 0
COUGH: 0
LOSS OF CONSCIOUSNESS: 0
ORTHOPNEA: 0
CHILLS: 0
HEADACHES: 0
BRUISES/BLEEDS EASILY: 0
FEVER: 0
MYALGIAS: 0
ABDOMINAL PAIN: 0
SHORTNESS OF BREATH: 0
INSOMNIA: 0
PALPITATIONS: 0

## 2023-10-23 NOTE — PROGRESS NOTES
Chief Complaint   Patient presents with    Follow-Up    Pacemaker Check/Dysfunction    HTN (Controlled)    Hyperlipidemia    Gout       Subjective     Huan Fish is a 68 y.o. male who presents today for six month follow-up for PM check, HTN, hyperlipidemia, history of PVCs and gout.    Roshan is a 68 year old male with history of sick sinus syndrome/sinus bradycardia with PPM since 2018, HTN, hyperlipidemia, PVCs and gout, last seen by me in April 2023.    He is here today for annual follow-up. Since the last visit, he feels good and has been doing well: he denies any chest pain, pressure, tightness or discomfort; no palpitations; no shortness of breath, orthopnea or PND; no dizziness or syncope; no LE edema. BP has been stable, running 120-130 systolic over 70-80 diastolic. He is compliant with his medications.    He and his wife will be going on a 30 day cruise to [facility] next February/March 2024.    Past Medical History:   Diagnosis Date    Cataract     Hyperlipidemia     Hypertension     Seizure (HCC)     questionable sz hx 2018    Sick sinus syndrome (HCC) 07/2018    Status post pacemaker placement.     Past Surgical History:   Procedure Laterality Date    PACEMAKER INSERTION Left 07/07/2018    Biotronik Edora 8 DR-T implanted at [facility].    OTHER  2018    Status post T12-L2 posterior spinal fusion bridging   (L1 fracture)    OTHER Right 2018    hemiarthroplasty/SHOULDER RECONSTRUCTION PARTIAL shoulder    TONSILLECTOMY       Family History   Problem Relation Age of Onset    Stroke Mother     Heart Disease Father     Hypertension Father     Heart Disease Brother      Social History     Socioeconomic History    Marital status:      Spouse name: Not on file    Number of children: Not on file    Years of education: Not on file    Highest education level: Not on file   Occupational History    Not on file   Tobacco Use    Smoking status: Never    Smokeless tobacco: Never   Vaping Use     "Vaping Use: Never used   Substance and Sexual Activity    Alcohol use: Yes     Alcohol/week: 2.4 oz     Types: 4 Standard drinks or equivalent per week     Comment: 2 times a week    Drug use: Never    Sexual activity: Not on file   Other Topics Concern    Not on file   Social History Narrative    Not on file     Social Determinants of Health     Financial Resource Strain: Not on file   Food Insecurity: Not on file   Transportation Needs: Not on file   Physical Activity: Not on file   Stress: Not on file   Social Connections: Not on file   Intimate Partner Violence: Not on file   Housing Stability: Not on file     No Known Allergies  Outpatient Encounter Medications as of 10/23/2023   Medication Sig Dispense Refill    atorvastatin (LIPITOR) 10 MG Tab Take 1 Tablet by mouth every evening. 100 Tablet 3    bisoprolol (ZEBETA) 5 MG Tab Take 1 Tablet by mouth every day. 100 Tablet 3    lisinopril (PRINIVIL) 10 MG Tab Take 1 Tablet by mouth every day. 100 Tablet 3    aspirin 81 MG EC tablet Take 1 Tablet by mouth every day.      allopurinol (ZYLOPRIM) 300 MG Tab TAKE 1 TABLET DAILY 90 Tab 3     No facility-administered encounter medications on file as of 10/23/2023.     Review of Systems   Constitutional:  Negative for chills and fever.   HENT:  Negative for congestion.    Respiratory:  Negative for cough and shortness of breath.    Cardiovascular:  Negative for chest pain, palpitations, orthopnea, leg swelling and PND.   Gastrointestinal:  Negative for abdominal pain and nausea.   Musculoskeletal:  Negative for myalgias.   Skin:  Negative for rash.   Neurological:  Negative for dizziness, loss of consciousness and headaches.   Endo/Heme/Allergies:  Does not bruise/bleed easily.   Psychiatric/Behavioral:  The patient does not have insomnia.               Objective     /75 (BP Location: Left arm, Patient Position: Sitting, BP Cuff Size: Adult)   Pulse 82   Resp 16   Ht 1.905 m (6' 3\")   Wt 95.3 kg (210 lb)   " SpO2 95%   BMI 26.25 kg/m²     Physical Exam  Constitutional:       Appearance: He is well-developed.   HENT:      Head: Normocephalic.   Neck:      Vascular: No JVD.   Cardiovascular:      Rate and Rhythm: Normal rate and regular rhythm.      Heart sounds: Normal heart sounds.      Comments: PM in left chest wall.  Pulmonary:      Effort: Pulmonary effort is normal. No respiratory distress.      Breath sounds: Normal breath sounds. No wheezing or rales.   Abdominal:      General: Bowel sounds are normal. There is no distension.      Palpations: Abdomen is soft.      Tenderness: There is no abdominal tenderness.   Musculoskeletal:         General: Normal range of motion.      Cervical back: Normal range of motion and neck supple.   Skin:     General: Skin is warm and dry.      Findings: No rash.   Neurological:      Mental Status: He is alert and oriented to person, place, and time.   Psychiatric:         Mood and Affect: Mood normal.         Behavior: Behavior normal.     PM interrogation today reveals normal function. NO mode switching episodes. No changes are made today.     FINDINGS OF MPI OF 6/6/2018:  TECHNICAL QUALITY: Good without artifacts.  MYOCARDIAL PERFUSION STRESS: No significant perfusion defects.   SUM STRESS SCORE: 3  LV ENLARGEMENT: No   LV EDV: 87 mL  LVEF: 56%   WALL MOTION: Normal myocardial contractility with preserved left ventricular myocardial systolic thickening.      No recent lab results.    Lab Results   Component Value Date/Time    CHOLSTRLTOT 175 11/05/2021 09:50 AM     (H) 11/05/2021 09:50 AM    HDL 41 11/05/2021 09:50 AM    TRIGLYCERIDE 129 11/05/2021 09:50 AM        Assessment & Plan     1. Presence of cardiac pacemaker        2. Sick sinus syndrome (HCC)        3. Primary hypertension  CBC WITHOUT DIFFERENTIAL      4. Mixed hyperlipidemia  Comp Metabolic Panel    Lipid Profile      5. Idiopathic chronic gout without tophus, unspecified site            Medical Decision  Making: Today's Assessment/Status/Plan:      1. Sick sinus syndrome with PPM, which is working normally. No mode switching episodes, and no changes are made today.    2. Hypertension, treated with Zebeta 5mg once daily and Lisinopril 10mg once daily. BP is good at home too.    3. Hyperlipidemia, treated with Lipitor 10mg. To check fasting CMP and lipid panel.    4. Gout, on Allopurinol, stable. No recent episodes.    He is doing well, and remains stable. Same medications for now. Labs as above. Follow-up in 6 months for next PM check.

## 2024-01-06 ENCOUNTER — NON-PROVIDER VISIT (OUTPATIENT)
Dept: CARDIOLOGY | Facility: MEDICAL CENTER | Age: 69
End: 2024-01-06
Payer: MEDICARE

## 2024-01-08 PROCEDURE — 93294 REM INTERROG EVL PM/LDLS PM: CPT | Performed by: INTERNAL MEDICINE

## 2024-01-08 NOTE — CARDIAC REMOTE MONITOR - SCAN
Device transmission reviewed. Device demonstrated appropriate function.       Electronically Signed by: Ilia Arreaga M.D.    1/8/2024  8:39 AM

## 2024-04-06 ENCOUNTER — NON-PROVIDER VISIT (OUTPATIENT)
Dept: CARDIOLOGY | Facility: MEDICAL CENTER | Age: 69
End: 2024-04-06
Payer: MEDICARE

## 2024-04-08 PROCEDURE — 93294 REM INTERROG EVL PM/LDLS PM: CPT | Performed by: INTERNAL MEDICINE

## 2024-04-23 ENCOUNTER — APPOINTMENT (OUTPATIENT)
Dept: CARDIOLOGY | Facility: MEDICAL CENTER | Age: 69
End: 2024-04-23
Attending: NURSE PRACTITIONER
Payer: MEDICARE

## 2024-07-06 ENCOUNTER — NON-PROVIDER VISIT (OUTPATIENT)
Dept: CARDIOLOGY | Facility: MEDICAL CENTER | Age: 69
End: 2024-07-06
Payer: MEDICARE

## 2024-07-08 PROCEDURE — 93294 REM INTERROG EVL PM/LDLS PM: CPT | Performed by: INTERNAL MEDICINE

## 2024-07-29 DIAGNOSIS — E78.49 OTHER HYPERLIPIDEMIA: ICD-10-CM

## 2024-07-29 DIAGNOSIS — I10 ESSENTIAL HYPERTENSION: ICD-10-CM

## 2024-07-30 RX ORDER — LISINOPRIL 10 MG/1
10 TABLET ORAL DAILY
Qty: 90 TABLET | Refills: 0 | Status: SHIPPED | OUTPATIENT
Start: 2024-07-30

## 2024-07-30 RX ORDER — BISOPROLOL FUMARATE 5 MG/1
5 TABLET, FILM COATED ORAL DAILY
Qty: 90 TABLET | Refills: 0 | Status: SHIPPED | OUTPATIENT
Start: 2024-07-30

## 2024-07-30 RX ORDER — ATORVASTATIN CALCIUM 10 MG/1
10 TABLET, FILM COATED ORAL EVERY EVENING
Qty: 90 TABLET | Refills: 0 | Status: SHIPPED | OUTPATIENT
Start: 2024-07-30

## 2024-08-06 NOTE — PROGRESS NOTES
Verbal consent was acquired by the patient to use [facility] ambient listening note generation during this visit Yes     Subjective:     CC:   Chief Complaint   Patient presents with    Requesting Labs    Follow-Up         HPI:   History of Present Illness  Huan Fish is a 68-year-old male who presents for follow-up medical visit.  He has discontinued his allopurinol medication for gout. He has now contracted COVID-19 twice, once in 2020 and again at the end of 03/2023 while on a cruise ship. During his cruise, he was confined to a room and was prescribed Paxlovid. He has not undergone a colonoscopy. He maintains regular eye and dental check-ups. He is scheduled to see a new cardiologist next week for a pacemaker check. He denies experiencing any abdominal issues, nausea, vomiting, or irregular bowel movements. He also denies any leg swelling, chest pain, or racing heart.      Past Medical History:   Diagnosis Date    Cataract     Hyperlipidemia     Hypertension     Seizure (HCC)     questionable sz hx 2018    Sick sinus syndrome (HCC) 07/2018    Status post pacemaker placement.       Social History     Tobacco Use    Smoking status: Never    Smokeless tobacco: Never   Vaping Use    Vaping status: Never Used   Substance Use Topics    Alcohol use: Yes     Alcohol/week: 2.4 oz     Types: 4 Standard drinks or equivalent per week     Comment: 2 times a week    Drug use: Never       Current Outpatient Medications Ordered in Epic   Medication Sig Dispense Refill    atorvastatin (LIPITOR) 10 MG Tab Take 1 Tablet by mouth every evening. 90 Tablet 3    bisoprolol (ZEBETA) 5 MG Tab Take 1 Tablet by mouth every day. 90 Tablet 3    lisinopril (PRINIVIL) 10 MG Tab Take 1 Tablet by mouth every day. 90 Tablet 3    aspirin 81 MG EC tablet Take 1 Tablet by mouth every day.      allopurinol (ZYLOPRIM) 300 MG Tab TAKE 1 TABLET DAILY (Patient not taking: Reported on 8/7/2024) 90 Tab 3     No current Epic-ordered  "facility-administered medications on file.       Allergies:  Patient has no known allergies.    Health Maintenance: Completed    Review of Systems:  No fevers or chills. No cough, chest pain, or shortness of breath.       Objective:       Exam:  /80   Pulse 72   Temp 36.5 °C (97.7 °F) (Temporal)   Ht 1.905 m (6' 3\")   Wt 93 kg (205 lb)   SpO2 95%   BMI 25.62 kg/m²  Body mass index is 25.62 kg/m².    Gen: Alert and oriented, No apparent distress.  Lungs: Normal effort, CTA bilaterally, no wheezes, rhonchi, or rales  CV: Regular rate and rhythm. No murmurs, rubs, or gallops.  Ext: No clubbing, cyanosis, edema.        Assessment & Plan:     68 y.o. male with the following -     Sick sinus syndrome (HCC)  Presence of cardiac pacemaker  Primary hypertension  Chronic condition, controlled.  The patient is managed by cardiology.  He is taking lisinopril 10 mg daily and bisoprolol 5 mg daily.  Blood pressure at today's visit is 130/80.  He denies palpitations, chest pain, shortness of breath, lightheadedness, lower extremity edema.  -Continue current regimen of lisinopril 10 mg daily and bisoprolol 5 mg daily as managed per cardiology  - bisoprolol (ZEBETA) 5 MG Tab; Take 1 Tablet by mouth every day.  Dispense: 90 Tablet; Refill: 3  - lisinopril (PRINIVIL) 10 MG Tab; Take 1 Tablet by mouth every day.  Dispense: 90 Tablet; Refill: 3     Mixed hyperlipidemia  Chronic condition, controlled.  The patient is managed by cardiology.  He is taking atorvastatin 10 mg nightly.  He denies statin associated myalgias.  Lipid panel from 11/5/2021 showed a total cholesterol 175, , HDL 41, triglycerides 129.  He is due for updated labs  -Continue current regimen of atorvastatin 10 mg nightly pending updated labs  - Lipid Profile; Future  - atorvastatin (LIPITOR) 10 MG Tab; Take 1 Tablet by mouth every evening.  Dispense: 90 Tablet; Refill: 3     Elevated fasting glucose  Chronic condition, controlled.  Currently managed " through dietary modification.  Most recent hemoglobin A1c from 12/20/2019 was normal at 5.6.  The patient is overdue for updated labs.  - Comp Metabolic Panel; Future  - HEMOGLOBIN A1C; Future     Idiopathic chronic gout without tophus, unspecified site  Chronic condition, controlled.  The patient was previously taking allopurinol 300 mg daily but has subsequently stopped the medication.  His most recent uric acid level from 12/20/2019 was normal at 5.4.  The patient has not had a flare in many years and wonders if he could stop taking his allopurinol.  Will repeat uric acid levels.  - URIC ACID; Future    Screening for deficiency anemia  - CBC WITH DIFFERENTIAL; Future    Prostate cancer screening  - PROSTATE SPECIFIC AG SCREENING; Future    Thyroid disorder screen  - TSH WITH REFLEX TO FT4; Future    Colon cancer screening  - Referral to GI for Colonoscopy      Return in about 6 months (around 2/7/2025) for 6-month f/u visit.    Please note that this dictation was created using voice recognition software. I have made every reasonable attempt to correct obvious errors, but I expect that there are errors of grammar and possibly content that I did not discover before finalizing the note.

## 2024-08-07 ENCOUNTER — APPOINTMENT (OUTPATIENT)
Dept: MEDICAL GROUP | Facility: PHYSICIAN GROUP | Age: 69
End: 2024-08-07
Payer: MEDICARE

## 2024-08-07 VITALS
WEIGHT: 205 LBS | SYSTOLIC BLOOD PRESSURE: 130 MMHG | HEIGHT: 75 IN | BODY MASS INDEX: 25.49 KG/M2 | HEART RATE: 72 BPM | OXYGEN SATURATION: 95 % | DIASTOLIC BLOOD PRESSURE: 80 MMHG | TEMPERATURE: 97.7 F

## 2024-08-07 DIAGNOSIS — I10 PRIMARY HYPERTENSION: ICD-10-CM

## 2024-08-07 DIAGNOSIS — M1A.00X0 IDIOPATHIC CHRONIC GOUT WITHOUT TOPHUS, UNSPECIFIED SITE: ICD-10-CM

## 2024-08-07 DIAGNOSIS — I49.5 SICK SINUS SYNDROME (HCC): ICD-10-CM

## 2024-08-07 DIAGNOSIS — Z95.0 PRESENCE OF CARDIAC PACEMAKER: ICD-10-CM

## 2024-08-07 DIAGNOSIS — Z13.29 THYROID DISORDER SCREEN: ICD-10-CM

## 2024-08-07 DIAGNOSIS — Z12.5 PROSTATE CANCER SCREENING: ICD-10-CM

## 2024-08-07 DIAGNOSIS — Z13.0 SCREENING FOR DEFICIENCY ANEMIA: ICD-10-CM

## 2024-08-07 DIAGNOSIS — E78.2 MIXED HYPERLIPIDEMIA: ICD-10-CM

## 2024-08-07 DIAGNOSIS — Z12.11 COLON CANCER SCREENING: ICD-10-CM

## 2024-08-07 DIAGNOSIS — R73.01 ELEVATED FASTING GLUCOSE: ICD-10-CM

## 2024-08-07 PROCEDURE — 3075F SYST BP GE 130 - 139MM HG: CPT | Performed by: INTERNAL MEDICINE

## 2024-08-07 PROCEDURE — 99214 OFFICE O/P EST MOD 30 MIN: CPT | Performed by: INTERNAL MEDICINE

## 2024-08-07 PROCEDURE — 3079F DIAST BP 80-89 MM HG: CPT | Performed by: INTERNAL MEDICINE

## 2024-08-07 ASSESSMENT — PATIENT HEALTH QUESTIONNAIRE - PHQ9: CLINICAL INTERPRETATION OF PHQ2 SCORE: 0

## 2024-08-12 RX ORDER — ATORVASTATIN CALCIUM 10 MG/1
10 TABLET, FILM COATED ORAL EVERY EVENING
Qty: 90 TABLET | Refills: 3 | Status: SHIPPED | OUTPATIENT
Start: 2024-08-12

## 2024-08-12 RX ORDER — LISINOPRIL 10 MG/1
10 TABLET ORAL DAILY
Qty: 90 TABLET | Refills: 3 | Status: SHIPPED | OUTPATIENT
Start: 2024-08-12

## 2024-08-12 RX ORDER — BISOPROLOL FUMARATE 5 MG/1
5 TABLET, FILM COATED ORAL DAILY
Qty: 90 TABLET | Refills: 3 | Status: SHIPPED | OUTPATIENT
Start: 2024-08-12

## 2024-08-19 ENCOUNTER — TELEPHONE (OUTPATIENT)
Dept: CARDIOLOGY | Facility: MEDICAL CENTER | Age: 69
End: 2024-08-19
Payer: MEDICARE

## 2024-08-19 NOTE — TELEPHONE ENCOUNTER
Called pt in regards to lab work that was ordered at previous OV. LVM for call back to see if the labs were completed somewhere outside of [facility]. Pt has follow up appointment scheduled with AB on 09.03.2024.

## 2024-08-21 ENCOUNTER — HOSPITAL ENCOUNTER (OUTPATIENT)
Dept: LAB | Facility: MEDICAL CENTER | Age: 69
End: 2024-08-21
Attending: INTERNAL MEDICINE
Payer: MEDICARE

## 2024-08-21 DIAGNOSIS — Z12.5 PROSTATE CANCER SCREENING: ICD-10-CM

## 2024-08-21 DIAGNOSIS — R73.01 ELEVATED FASTING GLUCOSE: ICD-10-CM

## 2024-08-21 DIAGNOSIS — E78.2 MIXED HYPERLIPIDEMIA: ICD-10-CM

## 2024-08-21 DIAGNOSIS — M1A.00X0 IDIOPATHIC CHRONIC GOUT WITHOUT TOPHUS, UNSPECIFIED SITE: ICD-10-CM

## 2024-08-21 DIAGNOSIS — Z13.0 SCREENING FOR DEFICIENCY ANEMIA: ICD-10-CM

## 2024-08-21 DIAGNOSIS — Z13.29 THYROID DISORDER SCREEN: ICD-10-CM

## 2024-08-21 LAB
ALBUMIN SERPL BCP-MCNC: 4.2 G/DL (ref 3.2–4.9)
ALBUMIN/GLOB SERPL: 1.4 G/DL
ALP SERPL-CCNC: 86 U/L (ref 30–99)
ALT SERPL-CCNC: 16 U/L (ref 2–50)
ANION GAP SERPL CALC-SCNC: 13 MMOL/L (ref 7–16)
AST SERPL-CCNC: 22 U/L (ref 12–45)
BASOPHILS # BLD AUTO: 0.9 % (ref 0–1.8)
BASOPHILS # BLD: 0.06 K/UL (ref 0–0.12)
BILIRUB SERPL-MCNC: 0.5 MG/DL (ref 0.1–1.5)
BUN SERPL-MCNC: 8 MG/DL (ref 8–22)
CALCIUM ALBUM COR SERPL-MCNC: 9.2 MG/DL (ref 8.5–10.5)
CALCIUM SERPL-MCNC: 9.4 MG/DL (ref 8.5–10.5)
CHLORIDE SERPL-SCNC: 107 MMOL/L (ref 96–112)
CHOLEST SERPL-MCNC: 171 MG/DL (ref 100–199)
CO2 SERPL-SCNC: 23 MMOL/L (ref 20–33)
CREAT SERPL-MCNC: 1.04 MG/DL (ref 0.5–1.4)
EOSINOPHIL # BLD AUTO: 0.16 K/UL (ref 0–0.51)
EOSINOPHIL NFR BLD: 2.4 % (ref 0–6.9)
ERYTHROCYTE [DISTWIDTH] IN BLOOD BY AUTOMATED COUNT: 42 FL (ref 35.9–50)
EST. AVERAGE GLUCOSE BLD GHB EST-MCNC: 114 MG/DL
FASTING STATUS PATIENT QL REPORTED: NORMAL
GFR SERPLBLD CREATININE-BSD FMLA CKD-EPI: 77 ML/MIN/1.73 M 2
GLOBULIN SER CALC-MCNC: 2.9 G/DL (ref 1.9–3.5)
GLUCOSE SERPL-MCNC: 86 MG/DL (ref 65–99)
HBA1C MFR BLD: 5.6 % (ref 4–5.6)
HCT VFR BLD AUTO: 49.3 % (ref 42–52)
HDLC SERPL-MCNC: 46 MG/DL
HGB BLD-MCNC: 16.1 G/DL (ref 14–18)
IMM GRANULOCYTES # BLD AUTO: 0.02 K/UL (ref 0–0.11)
IMM GRANULOCYTES NFR BLD AUTO: 0.3 % (ref 0–0.9)
LDLC SERPL CALC-MCNC: 106 MG/DL
LYMPHOCYTES # BLD AUTO: 2.08 K/UL (ref 1–4.8)
LYMPHOCYTES NFR BLD: 31.2 % (ref 22–41)
MCH RBC QN AUTO: 30.1 PG (ref 27–33)
MCHC RBC AUTO-ENTMCNC: 32.7 G/DL (ref 32.3–36.5)
MCV RBC AUTO: 92.1 FL (ref 81.4–97.8)
MONOCYTES # BLD AUTO: 0.56 K/UL (ref 0–0.85)
MONOCYTES NFR BLD AUTO: 8.4 % (ref 0–13.4)
NEUTROPHILS # BLD AUTO: 3.79 K/UL (ref 1.82–7.42)
NEUTROPHILS NFR BLD: 56.8 % (ref 44–72)
NRBC # BLD AUTO: 0 K/UL
NRBC BLD-RTO: 0 /100 WBC (ref 0–0.2)
PLATELET # BLD AUTO: 258 K/UL (ref 164–446)
PMV BLD AUTO: 10.2 FL (ref 9–12.9)
POTASSIUM SERPL-SCNC: 4.5 MMOL/L (ref 3.6–5.5)
PROT SERPL-MCNC: 7.1 G/DL (ref 6–8.2)
PSA SERPL-MCNC: 1.53 NG/ML (ref 0–4)
RBC # BLD AUTO: 5.35 M/UL (ref 4.7–6.1)
SODIUM SERPL-SCNC: 143 MMOL/L (ref 135–145)
TRIGL SERPL-MCNC: 96 MG/DL (ref 0–149)
TSH SERPL DL<=0.005 MIU/L-ACNC: 2.45 UIU/ML (ref 0.38–5.33)
URATE SERPL-MCNC: 8 MG/DL (ref 2.5–8.3)
WBC # BLD AUTO: 6.7 K/UL (ref 4.8–10.8)

## 2024-08-21 PROCEDURE — 36415 COLL VENOUS BLD VENIPUNCTURE: CPT

## 2024-08-21 PROCEDURE — 80061 LIPID PANEL: CPT

## 2024-08-21 PROCEDURE — 85025 COMPLETE CBC W/AUTO DIFF WBC: CPT

## 2024-08-21 PROCEDURE — 80053 COMPREHEN METABOLIC PANEL: CPT

## 2024-08-21 PROCEDURE — 84550 ASSAY OF BLOOD/URIC ACID: CPT

## 2024-08-21 PROCEDURE — 84443 ASSAY THYROID STIM HORMONE: CPT

## 2024-08-21 PROCEDURE — 84153 ASSAY OF PSA TOTAL: CPT | Mod: GA

## 2024-08-21 PROCEDURE — 83036 HEMOGLOBIN GLYCOSYLATED A1C: CPT | Mod: GA

## 2024-09-03 ENCOUNTER — NON-PROVIDER VISIT (OUTPATIENT)
Dept: CARDIOLOGY | Facility: MEDICAL CENTER | Age: 69
End: 2024-09-03
Attending: NURSE PRACTITIONER
Payer: MEDICARE

## 2024-09-03 VITALS
RESPIRATION RATE: 12 BRPM | BODY MASS INDEX: 25.24 KG/M2 | SYSTOLIC BLOOD PRESSURE: 122 MMHG | HEIGHT: 75 IN | OXYGEN SATURATION: 96 % | HEART RATE: 72 BPM | WEIGHT: 203 LBS | DIASTOLIC BLOOD PRESSURE: 78 MMHG

## 2024-09-03 VITALS
HEART RATE: 72 BPM | SYSTOLIC BLOOD PRESSURE: 122 MMHG | WEIGHT: 203 LBS | RESPIRATION RATE: 12 BRPM | BODY MASS INDEX: 25.24 KG/M2 | OXYGEN SATURATION: 96 % | HEIGHT: 75 IN | DIASTOLIC BLOOD PRESSURE: 78 MMHG

## 2024-09-03 DIAGNOSIS — I49.3 PVCS (PREMATURE VENTRICULAR CONTRACTIONS): ICD-10-CM

## 2024-09-03 DIAGNOSIS — E78.2 MIXED HYPERLIPIDEMIA: ICD-10-CM

## 2024-09-03 DIAGNOSIS — Z95.0 PRESENCE OF CARDIAC PACEMAKER: ICD-10-CM

## 2024-09-03 DIAGNOSIS — I10 PRIMARY HYPERTENSION: ICD-10-CM

## 2024-09-03 DIAGNOSIS — I49.5 SICK SINUS SYNDROME (HCC): ICD-10-CM

## 2024-09-03 DIAGNOSIS — I47.29 VENTRICULAR TACHYCARDIA, NONSUSTAINED (HCC): ICD-10-CM

## 2024-09-03 PROCEDURE — 99214 OFFICE O/P EST MOD 30 MIN: CPT | Performed by: NURSE PRACTITIONER

## 2024-09-03 PROCEDURE — 93288 INTERROG EVL PM/LDLS PM IP: CPT | Mod: 26 | Performed by: NURSE PRACTITIONER

## 2024-09-03 PROCEDURE — 3078F DIAST BP <80 MM HG: CPT | Performed by: NURSE PRACTITIONER

## 2024-09-03 PROCEDURE — 99211 OFF/OP EST MAY X REQ PHY/QHP: CPT | Performed by: NURSE PRACTITIONER

## 2024-09-03 PROCEDURE — 3074F SYST BP LT 130 MM HG: CPT | Performed by: NURSE PRACTITIONER

## 2024-09-03 ASSESSMENT — ENCOUNTER SYMPTOMS
PND: 0
HEADACHES: 0
NAUSEA: 0
BRUISES/BLEEDS EASILY: 0
ABDOMINAL PAIN: 0
COUGH: 0
FEVER: 0
SHORTNESS OF BREATH: 0
CHILLS: 0
LOSS OF CONSCIOUSNESS: 0
INSOMNIA: 0
DIZZINESS: 0
PALPITATIONS: 0
ORTHOPNEA: 0
MYALGIAS: 0

## 2024-09-03 ASSESSMENT — FIBROSIS 4 INDEX
FIB4 SCORE: 1.49
FIB4 SCORE: 1.49

## 2024-09-03 NOTE — PROGRESS NOTES
Chief Complaint   Patient presents with    Follow-Up    Pacemaker Check/Dysfunction    Bradycardia    HTN (Controlled)    Hyperlipidemia       Subjective     Huan Fish is a 69 y.o. male who presents today for annual follow-up for PM check, SSS, PVCs, HTN and hyperlipidemia.    Roshan is a 69 year old male with history of sick sinus syndrome/sinus bradycardia with PPM since 2018, HTN, hyperlipidemia, PVCs and gout, last seen by me in October 2023. He is remotely monitored every 3 months.     He is here today for annual follow-up. Since the last visit, he feels good and has been doing well: he denies any chest pain, pressure, tightness or discomfort; no palpitations; no shortness of breath, orthopnea or PND; no dizziness or syncope; no LE edema. BP is still good at home.    He did see his PCP last month, and had a complete lab panel done; everything was stable.    Past Medical History:   Diagnosis Date    Cataract     Hyperlipidemia     Hypertension     Seizure (HCC)     questionable sz hx 2018    Sick sinus syndrome (HCC) 07/2018    Status post pacemaker placement.     Past Surgical History:   Procedure Laterality Date    PACEMAKER INSERTION Left 07/07/2018    Biotronik Edora 8 JEANNINE implanted at [facility].    OTHER  2018    Status post T12-L2 posterior spinal fusion bridging   (L1 fracture)    OTHER Right 2018    hemiarthroplasty/SHOULDER RECONSTRUCTION PARTIAL shoulder    TONSILLECTOMY       Family History   Problem Relation Age of Onset    Stroke Mother     Heart Disease Father     Hypertension Father     Heart Disease Brother      Social History     Socioeconomic History    Marital status:      Spouse name: Not on file    Number of children: Not on file    Years of education: Not on file    Highest education level: Not on file   Occupational History    Not on file   Tobacco Use    Smoking status: Never    Smokeless tobacco: Never   Vaping Use    Vaping status: Never Used   Substance and  "Sexual Activity    Alcohol use: Yes     Alcohol/week: 2.4 oz     Types: 4 Standard drinks or equivalent per week     Comment: 2 times a week    Drug use: Never    Sexual activity: Not on file   Other Topics Concern    Not on file   Social History Narrative    Not on file     Social Determinants of Health     Financial Resource Strain: Not on file   Food Insecurity: Not on file   Transportation Needs: Not on file   Physical Activity: Not on file   Stress: Not on file   Social Connections: Not on file   Intimate Partner Violence: Not on file   Housing Stability: Not on file     No Known Allergies  Outpatient Encounter Medications as of 9/3/2024   Medication Sig Dispense Refill    atorvastatin (LIPITOR) 10 MG Tab Take 1 Tablet by mouth every evening. 90 Tablet 3    bisoprolol (ZEBETA) 5 MG Tab Take 1 Tablet by mouth every day. 90 Tablet 3    lisinopril (PRINIVIL) 10 MG Tab Take 1 Tablet by mouth every day. 90 Tablet 3    aspirin 81 MG EC tablet Take 1 Tablet by mouth every day.      allopurinol (ZYLOPRIM) 300 MG Tab TAKE 1 TABLET DAILY 90 Tab 3     No facility-administered encounter medications on file as of 9/3/2024.     Review of Systems   Constitutional:  Negative for chills and fever.   HENT:  Negative for congestion.    Respiratory:  Negative for cough and shortness of breath.    Cardiovascular:  Negative for chest pain, palpitations, orthopnea, leg swelling and PND.   Gastrointestinal:  Negative for abdominal pain and nausea.   Musculoskeletal:  Negative for myalgias.   Skin:  Negative for rash.   Neurological:  Negative for dizziness, loss of consciousness and headaches.   Endo/Heme/Allergies:  Does not bruise/bleed easily.   Psychiatric/Behavioral:  The patient does not have insomnia.               Objective     /78 (BP Location: Left arm, Patient Position: Sitting, BP Cuff Size: Adult)   Pulse 72   Resp 12   Ht 1.905 m (6' 3\")   Wt 92.1 kg (203 lb)   SpO2 96%   BMI 25.37 kg/m²     Physical " Exam  Constitutional:       Appearance: He is well-developed.   HENT:      Head: Normocephalic.   Neck:      Vascular: No JVD.   Cardiovascular:      Rate and Rhythm: Normal rate and regular rhythm.      Heart sounds: Normal heart sounds.      Comments: PM in left chest wall.  Pulmonary:      Effort: Pulmonary effort is normal. No respiratory distress.      Breath sounds: Normal breath sounds. No wheezing or rales.   Abdominal:      General: Bowel sounds are normal. There is no distension.      Palpations: Abdomen is soft.      Tenderness: There is no abdominal tenderness.   Musculoskeletal:         General: Normal range of motion.      Cervical back: Normal range of motion and neck supple.   Skin:     General: Skin is warm and dry.      Findings: No rash.   Neurological:      Mental Status: He is alert and oriented to person, place, and time.   Psychiatric:         Mood and Affect: Mood normal.         Behavior: Behavior normal.       PM interrogation today reveals normal function. N0 mode switching episodes; 3 brief AT episodes. No changes are made today. Battery is good for 4 years 10 months.     FINDINGS OF MPI OF 6/6/2018:  TECHNICAL QUALITY: Good without artifacts.  MYOCARDIAL PERFUSION STRESS: No significant perfusion defects.   SUM STRESS SCORE: 3  LV ENLARGEMENT: No   LV EDV: 87 mL  LVEF: 56%   WALL MOTION: Normal myocardial contractility with preserved left ventricular myocardial systolic thickening.       Lab Results   Component Value Date/Time    CHOLSTRLTOT 171 08/21/2024 10:52 AM     (H) 08/21/2024 10:52 AM    HDL 46 08/21/2024 10:52 AM    TRIGLYCERIDE 96 08/21/2024 10:52 AM        Lab Results   Component Value Date/Time    ASTSGOT 22 08/21/2024 10:52 AM    ALTSGPT 16 08/21/2024 10:52 AM       Lab Results   Component Value Date/Time    SODIUM 143 08/21/2024 10:52 AM    POTASSIUM 4.5 08/21/2024 10:52 AM    CHLORIDE 107 08/21/2024 10:52 AM    CO2 23 08/21/2024 10:52 AM    GLUCOSE 86 08/21/2024  10:52 AM    BUN 8 08/21/2024 10:52 AM    CREATININE 1.04 08/21/2024 10:52 AM             Assessment & Plan     1. Presence of cardiac pacemaker        2. Sick sinus syndrome (HCC)        3. Ventricular tachycardia, nonsustained (HCC)        4. PVCs (premature ventricular contractions)        5. Primary hypertension        6. Mixed hyperlipidemia            Medical Decision Making: Today's Assessment/Status/Plan:      1. Sick sinus syndrome with PPM, which is working normally. No mode switching episodes, and 3 brief AT episodes. No changes are made today.    2. History of PVCs, stable on Zebeta. PVC burden is 7%.    3. Hypertension, treated with Zebeta 5mg and Lisinopril 10mg once daily. BP is stable today, as well as at home.    4. Hyperlipidemia, treated with Lipitor 10mg.    Same medications for now.  Labs per PCP.    Continue with remote monitoring of PM every 3 months.  Follow-up in 1 year for in-office PM interrogation and routine cardiology follow-up; follow-up sooner if clinical condition changes.

## 2024-10-05 ENCOUNTER — NON-PROVIDER VISIT (OUTPATIENT)
Dept: CARDIOLOGY | Facility: MEDICAL CENTER | Age: 69
End: 2024-10-05
Payer: MEDICARE

## 2024-10-07 PROCEDURE — 93294 REM INTERROG EVL PM/LDLS PM: CPT | Performed by: STUDENT IN AN ORGANIZED HEALTH CARE EDUCATION/TRAINING PROGRAM

## 2025-01-04 ENCOUNTER — NON-PROVIDER VISIT (OUTPATIENT)
Dept: CARDIOLOGY | Facility: MEDICAL CENTER | Age: 70
End: 2025-01-04
Payer: MEDICARE

## 2025-01-06 PROCEDURE — 93294 REM INTERROG EVL PM/LDLS PM: CPT | Performed by: INTERNAL MEDICINE

## 2025-01-06 NOTE — CARDIAC REMOTE MONITOR - SCAN
Device transmission reviewed. Device demonstrated appropriate function.       Electronically Signed by: Keyla Lam M.D.    1/20/2025  3:19 PM

## 2025-04-05 ENCOUNTER — NON-PROVIDER VISIT (OUTPATIENT)
Dept: CARDIOLOGY | Facility: MEDICAL CENTER | Age: 70
End: 2025-04-05
Payer: MEDICARE

## 2025-04-07 PROCEDURE — 93294 REM INTERROG EVL PM/LDLS PM: CPT | Performed by: INTERNAL MEDICINE

## 2025-04-07 NOTE — CARDIAC REMOTE MONITOR - SCAN
Device transmission reviewed. Device demonstrated appropriate function.       Electronically Signed by: Santo Mayberry M.D.    4/10/2025  1:57 PM

## 2025-07-05 ENCOUNTER — NON-PROVIDER VISIT (OUTPATIENT)
Dept: CARDIOLOGY | Facility: MEDICAL CENTER | Age: 70
End: 2025-07-05
Payer: MEDICARE

## 2025-07-05 PROCEDURE — 93294 REM INTERROG EVL PM/LDLS PM: CPT | Performed by: INTERNAL MEDICINE

## 2025-07-08 NOTE — CARDIAC REMOTE MONITOR - SCAN
Device transmission reviewed. Device demonstrated appropriate function.       Electronically Signed by: Santo Mayberry M.D.    7/9/2025  5:32 PM

## 2025-08-14 ENCOUNTER — OFFICE VISIT (OUTPATIENT)
Dept: MEDICAL GROUP | Facility: PHYSICIAN GROUP | Age: 70
End: 2025-08-14
Payer: MEDICARE

## 2025-08-14 VITALS
RESPIRATION RATE: 15 BRPM | TEMPERATURE: 98.7 F | DIASTOLIC BLOOD PRESSURE: 82 MMHG | BODY MASS INDEX: 25.64 KG/M2 | OXYGEN SATURATION: 96 % | SYSTOLIC BLOOD PRESSURE: 134 MMHG | HEIGHT: 75 IN | WEIGHT: 206.2 LBS | HEART RATE: 84 BPM

## 2025-08-14 DIAGNOSIS — E78.2 MIXED HYPERLIPIDEMIA: ICD-10-CM

## 2025-08-14 DIAGNOSIS — R73.01 ELEVATED FASTING GLUCOSE: Primary | ICD-10-CM

## 2025-08-14 DIAGNOSIS — I10 PRIMARY HYPERTENSION: ICD-10-CM

## 2025-08-14 DIAGNOSIS — M1A.00X0 IDIOPATHIC CHRONIC GOUT WITHOUT TOPHUS, UNSPECIFIED SITE: ICD-10-CM

## 2025-08-14 DIAGNOSIS — R53.83 FATIGUE, UNSPECIFIED TYPE: ICD-10-CM

## 2025-08-14 DIAGNOSIS — E78.5 DYSLIPIDEMIA: ICD-10-CM

## 2025-08-14 DIAGNOSIS — Z12.5 ENCOUNTER FOR SCREENING FOR MALIGNANT NEOPLASM OF PROSTATE: ICD-10-CM

## 2025-08-14 PROBLEM — S42.301A RIGHT HUMERAL FRACTURE: Status: RESOLVED | Noted: 2018-01-31 | Resolved: 2025-08-14

## 2025-08-14 PROBLEM — S32.009A FRACTURE OF LUMBAR VERTEBRA (HCC): Status: RESOLVED | Noted: 2018-02-26 | Resolved: 2025-08-14

## 2025-08-14 PROCEDURE — 3079F DIAST BP 80-89 MM HG: CPT | Performed by: PHYSICIAN ASSISTANT

## 2025-08-14 PROCEDURE — 99214 OFFICE O/P EST MOD 30 MIN: CPT | Performed by: PHYSICIAN ASSISTANT

## 2025-08-14 PROCEDURE — 3075F SYST BP GE 130 - 139MM HG: CPT | Performed by: PHYSICIAN ASSISTANT

## 2025-08-14 RX ORDER — LISINOPRIL 10 MG/1
10 TABLET ORAL DAILY
Qty: 90 TABLET | Refills: 0 | Status: SHIPPED | OUTPATIENT
Start: 2025-08-14

## 2025-08-14 RX ORDER — ATORVASTATIN CALCIUM 10 MG/1
10 TABLET, FILM COATED ORAL EVERY EVENING
Qty: 90 TABLET | Refills: 0 | Status: SHIPPED | OUTPATIENT
Start: 2025-08-14

## 2025-08-14 RX ORDER — BISOPROLOL FUMARATE 5 MG/1
5 TABLET, FILM COATED ORAL DAILY
Qty: 90 TABLET | Refills: 0 | Status: SHIPPED | OUTPATIENT
Start: 2025-08-14

## 2025-08-14 ASSESSMENT — PATIENT HEALTH QUESTIONNAIRE - PHQ9: CLINICAL INTERPRETATION OF PHQ2 SCORE: 0

## 2025-08-14 ASSESSMENT — FIBROSIS 4 INDEX: FIB4 SCORE: 1.49

## 2025-08-14 ASSESSMENT — ENCOUNTER SYMPTOMS
SHORTNESS OF BREATH: 0
CHILLS: 0
FEVER: 0